# Patient Record
Sex: FEMALE | Race: WHITE | NOT HISPANIC OR LATINO | Employment: UNEMPLOYED | URBAN - METROPOLITAN AREA
[De-identification: names, ages, dates, MRNs, and addresses within clinical notes are randomized per-mention and may not be internally consistent; named-entity substitution may affect disease eponyms.]

---

## 2017-05-19 RX ORDER — SUMATRIPTAN SUCCINATE 100 MG/1
TABLET ORAL
Qty: 6 TABLET | Refills: 3 | Status: SHIPPED | OUTPATIENT
Start: 2017-05-19 | End: 2023-02-01

## 2018-04-14 RX ORDER — SUMATRIPTAN SUCCINATE 100 MG/1
TABLET ORAL
OUTPATIENT
Start: 2018-04-14

## 2019-04-16 ENCOUNTER — CLINICAL SUPPORT (OUTPATIENT)
Dept: URGENT CARE | Facility: CLINIC | Age: 49
End: 2019-04-16
Payer: COMMERCIAL

## 2019-04-16 VITALS
RESPIRATION RATE: 16 BRPM | WEIGHT: 176.63 LBS | OXYGEN SATURATION: 94 % | TEMPERATURE: 98 F | HEART RATE: 88 BPM | BODY MASS INDEX: 27.66 KG/M2 | DIASTOLIC BLOOD PRESSURE: 79 MMHG | SYSTOLIC BLOOD PRESSURE: 128 MMHG

## 2019-04-16 DIAGNOSIS — M79.10 MYALGIA: ICD-10-CM

## 2019-04-16 DIAGNOSIS — R68.89 FLU-LIKE SYMPTOMS: Primary | ICD-10-CM

## 2019-04-16 LAB
CTP QC/QA: YES
FLUAV AG NPH QL: NEGATIVE
FLUBV AG NPH QL: NEGATIVE

## 2019-04-16 PROCEDURE — 87804 INFLUENZA ASSAY W/OPTIC: CPT | Mod: QW,,, | Performed by: NURSE PRACTITIONER

## 2019-04-16 PROCEDURE — 87804 POCT INFLUENZA A/B: ICD-10-PCS | Mod: 59,QW,, | Performed by: NURSE PRACTITIONER

## 2019-04-16 PROCEDURE — 99204 PR OFFICE/OUTPT VISIT, NEW, LEVL IV, 45-59 MIN: ICD-10-PCS | Mod: 25,S$GLB,, | Performed by: NURSE PRACTITIONER

## 2019-04-16 PROCEDURE — 96372 PR INJECTION,THERAP/PROPH/DIAG2ST, IM OR SUBCUT: ICD-10-PCS | Mod: S$GLB,,, | Performed by: NURSE PRACTITIONER

## 2019-04-16 PROCEDURE — 96372 THER/PROPH/DIAG INJ SC/IM: CPT | Mod: S$GLB,,, | Performed by: NURSE PRACTITIONER

## 2019-04-16 PROCEDURE — 99204 OFFICE O/P NEW MOD 45 MIN: CPT | Mod: 25,S$GLB,, | Performed by: NURSE PRACTITIONER

## 2019-04-16 RX ORDER — PREDNISONE 20 MG/1
20 TABLET ORAL 2 TIMES DAILY
Qty: 10 TABLET | Refills: 0 | Status: SHIPPED | OUTPATIENT
Start: 2019-04-16 | End: 2019-04-21

## 2019-04-16 RX ORDER — DEXAMETHASONE SODIUM PHOSPHATE 4 MG/ML
8 INJECTION, SOLUTION INTRA-ARTICULAR; INTRALESIONAL; INTRAMUSCULAR; INTRAVENOUS; SOFT TISSUE
Status: COMPLETED | OUTPATIENT
Start: 2019-04-16 | End: 2019-04-16

## 2019-04-16 RX ORDER — CODEINE PHOSPHATE AND GUAIFENESIN 10; 100 MG/5ML; MG/5ML
5 SOLUTION ORAL 3 TIMES DAILY PRN
Qty: 120 ML | Refills: 0 | Status: SHIPPED | OUTPATIENT
Start: 2019-04-16 | End: 2019-04-26

## 2019-04-16 RX ADMIN — DEXAMETHASONE SODIUM PHOSPHATE 8 MG: 4 INJECTION, SOLUTION INTRA-ARTICULAR; INTRALESIONAL; INTRAMUSCULAR; INTRAVENOUS; SOFT TISSUE at 11:04

## 2019-04-16 NOTE — PROGRESS NOTES
Subjective: cough, HA. Bodyaches, stomach ache, nausea, runny nose       Patient ID: Glenis Ruggiero is a 48 y.o. female.    Vitals:  weight is 80.1 kg (176 lb 9.6 oz). Her temperature is 98.4 °F (36.9 °C). Her blood pressure is 128/79 and her pulse is 88. Her respiration is 16 and oxygen saturation is 94% (abnormal).     Chief Complaint: Sinus Problem (cough, bodyaches, stomach ache, nausea)    Sinus Problem   This is a new problem. The current episode started yesterday. The problem is unchanged. Associated symptoms include chills, congestion, coughing, headaches, sinus pressure and a sore throat. Pertinent negatives include no shortness of breath. The treatment provided no relief.       Constitution: Positive for chills, fatigue and fever.   HENT: Positive for congestion, postnasal drip, sinus pain, sinus pressure and sore throat.    Neck: Negative for painful lymph nodes.   Cardiovascular: Negative for chest pain and leg swelling.   Eyes: Negative for double vision and blurred vision.   Respiratory: Positive for cough. Negative for shortness of breath.    Gastrointestinal: Negative for nausea, vomiting and diarrhea.   Genitourinary: Negative for dysuria, frequency, urgency and history of kidney stones.   Musculoskeletal: Negative for joint pain, joint swelling, muscle cramps and muscle ache.   Skin: Negative for color change, pale, rash and bruising.   Allergic/Immunologic: Negative for seasonal allergies.   Neurological: Positive for headaches. Negative for dizziness, history of vertigo, light-headedness and passing out.   Hematologic/Lymphatic: Negative for swollen lymph nodes.   Psychiatric/Behavioral: Negative for nervous/anxious, sleep disturbance and depression. The patient is not nervous/anxious.        Objective:      Physical Exam   Constitutional: She is oriented to person, place, and time. Vital signs are normal. She appears well-developed and well-nourished. She is cooperative. She appears ill.   HENT:    Head: Normocephalic.   Right Ear: Hearing, external ear and ear canal normal. A middle ear effusion is present.   Left Ear: Hearing, external ear and ear canal normal. A middle ear effusion is present.   Nose: Mucosal edema and rhinorrhea present. Right sinus exhibits maxillary sinus tenderness. Left sinus exhibits maxillary sinus tenderness.   Mouth/Throat: Uvula is midline and mucous membranes are normal. Posterior oropharyngeal erythema present.   Eyes: Pupils are equal, round, and reactive to light. Conjunctivae, EOM and lids are normal.   Neck: Trachea normal, normal range of motion, full passive range of motion without pain and phonation normal. Neck supple.   Cardiovascular: Normal rate, regular rhythm, normal heart sounds, intact distal pulses and normal pulses.   Pulmonary/Chest: Effort normal and breath sounds normal.   Abdominal: Soft. Normal appearance, normal aorta and bowel sounds are normal. There is no tenderness.   Musculoskeletal: Normal range of motion.   Neurological: She is alert and oriented to person, place, and time. She has normal strength. GCS eye subscore is 4. GCS verbal subscore is 5. GCS motor subscore is 6.   Skin: Skin is warm, dry and intact. Capillary refill takes less than 2 seconds.   Psychiatric: She has a normal mood and affect. Her speech is normal and behavior is normal. Judgment and thought content normal. Cognition and memory are normal.       Assessment:       1. Flu-like symptoms    2. Myalgia        Plan:         Flu-like symptoms    Myalgia  -     POCT Influenza A/B    Other orders  -     baloxavir marboxil 40 mg Tab; Take 80 mg by mouth once. for 1 dose  Dispense: 2 tablet; Refill: 0  -     dexamethasone injection 8 mg  -     predniSONE (DELTASONE) 20 MG tablet; Take 1 tablet (20 mg total) by mouth 2 (two) times daily. for 5 days  Dispense: 10 tablet; Refill: 0  -     guaifenesin-codeine 100-10 mg/5 ml (CHERATUSSIN AC)  mg/5 mL syrup; Take 5 mLs by mouth 3  (three) times daily as needed for Cough.  Dispense: 120 mL; Refill: 0

## 2019-09-03 DIAGNOSIS — N95.8 POSTARTIFICIAL MENOPAUSAL SYNDROME: ICD-10-CM

## 2019-09-03 DIAGNOSIS — Z13.820 SCREENING FOR OSTEOPOROSIS: Primary | ICD-10-CM

## 2019-09-03 DIAGNOSIS — Z12.39 SCREENING BREAST EXAMINATION: ICD-10-CM

## 2019-09-06 DIAGNOSIS — N64.9 DISORDER OF BREAST: Primary | ICD-10-CM

## 2019-09-13 ENCOUNTER — HOSPITAL ENCOUNTER (OUTPATIENT)
Dept: RADIOLOGY | Facility: HOSPITAL | Age: 49
Discharge: HOME | End: 2019-09-13
Attending: SPECIALIST
Payer: COMMERCIAL

## 2019-09-13 ENCOUNTER — HOSPITAL ENCOUNTER (OUTPATIENT)
Dept: RADIOLOGY | Facility: HOSPITAL | Age: 49
Discharge: HOME | End: 2019-09-13
Attending: NURSE PRACTITIONER
Payer: COMMERCIAL

## 2019-09-13 VITALS — HEIGHT: 67 IN | BODY MASS INDEX: 25.9 KG/M2 | WEIGHT: 165 LBS

## 2019-09-13 DIAGNOSIS — N64.9 DISORDER OF BREAST: ICD-10-CM

## 2019-09-13 DIAGNOSIS — N95.8 POSTARTIFICIAL MENOPAUSAL SYNDROME: ICD-10-CM

## 2019-09-13 DIAGNOSIS — Z13.820 SCREENING FOR OSTEOPOROSIS: ICD-10-CM

## 2019-09-13 PROCEDURE — 77062 BREAST TOMOSYNTHESIS BI: CPT | Mod: TC,PO

## 2019-09-13 PROCEDURE — 77080 DXA BONE DENSITY AXIAL: CPT | Mod: TC,PO

## 2019-09-13 PROCEDURE — 76642 ULTRASOUND BREAST LIMITED: CPT | Mod: TC,PO,LT

## 2020-10-22 ENCOUNTER — HOSPITAL ENCOUNTER (OUTPATIENT)
Dept: RADIOLOGY | Facility: HOSPITAL | Age: 50
Discharge: HOME | End: 2020-10-22
Attending: SPECIALIST
Payer: COMMERCIAL

## 2020-10-22 DIAGNOSIS — Z12.31 BREAST CANCER SCREENING BY MAMMOGRAM: ICD-10-CM

## 2020-10-22 PROCEDURE — 77067 SCR MAMMO BI INCL CAD: CPT | Mod: TC,PO

## 2020-11-18 DIAGNOSIS — R92.8 ABNORMAL FINDINGS ON DIAGNOSTIC IMAGING OF BREAST: Primary | ICD-10-CM

## 2021-01-22 ENCOUNTER — HOSPITAL ENCOUNTER (OUTPATIENT)
Dept: RADIOLOGY | Facility: HOSPITAL | Age: 51
Discharge: HOME | End: 2021-01-22
Attending: NURSE PRACTITIONER
Payer: COMMERCIAL

## 2021-01-22 DIAGNOSIS — R92.8 ABNORMAL FINDINGS ON DIAGNOSTIC IMAGING OF BREAST: ICD-10-CM

## 2021-01-22 PROCEDURE — 77061 BREAST TOMOSYNTHESIS UNI: CPT | Mod: TC,PO,LT

## 2021-11-16 DIAGNOSIS — Z78.0 MENOPAUSE: Primary | ICD-10-CM

## 2021-12-01 DIAGNOSIS — Z12.31 ENCOUNTER FOR SCREENING MAMMOGRAM FOR MALIGNANT NEOPLASM OF BREAST: Primary | ICD-10-CM

## 2022-08-19 ENCOUNTER — HOSPITAL ENCOUNTER (EMERGENCY)
Facility: HOSPITAL | Age: 52
Discharge: HOME | End: 2022-08-19
Attending: EMERGENCY MEDICINE
Payer: COMMERCIAL

## 2022-08-19 VITALS
SYSTOLIC BLOOD PRESSURE: 125 MMHG | WEIGHT: 150.38 LBS | HEART RATE: 84 BPM | RESPIRATION RATE: 22 BRPM | TEMPERATURE: 97 F | OXYGEN SATURATION: 100 % | DIASTOLIC BLOOD PRESSURE: 73 MMHG | BODY MASS INDEX: 23.6 KG/M2 | HEIGHT: 67 IN

## 2022-08-19 DIAGNOSIS — K52.9 GASTROENTERITIS: Primary | ICD-10-CM

## 2022-08-19 DIAGNOSIS — K51.80 OTHER ULCERATIVE COLITIS WITHOUT COMPLICATION: ICD-10-CM

## 2022-08-19 LAB
ALBUMIN SERPL BCP-MCNC: 3.7 G/DL (ref 3.5–5.2)
ALP SERPL-CCNC: 80 U/L (ref 55–135)
ALT SERPL W/O P-5'-P-CCNC: 26 U/L (ref 10–44)
ANION GAP SERPL CALC-SCNC: 12 MMOL/L (ref 8–16)
AST SERPL-CCNC: 24 U/L (ref 10–40)
BASOPHILS # BLD AUTO: 0.04 K/UL (ref 0–0.2)
BASOPHILS NFR BLD: 0.3 % (ref 0–1.9)
BILIRUB SERPL-MCNC: 0.7 MG/DL (ref 0.1–1)
BUN SERPL-MCNC: 17 MG/DL (ref 6–20)
CALCIUM SERPL-MCNC: 8.9 MG/DL (ref 8.7–10.5)
CHLORIDE SERPL-SCNC: 104 MMOL/L (ref 95–110)
CO2 SERPL-SCNC: 24 MMOL/L (ref 23–29)
CREAT SERPL-MCNC: 0.7 MG/DL (ref 0.5–1.4)
DIFFERENTIAL METHOD: ABNORMAL
EOSINOPHIL # BLD AUTO: 0.1 K/UL (ref 0–0.5)
EOSINOPHIL NFR BLD: 0.7 % (ref 0–8)
ERYTHROCYTE [DISTWIDTH] IN BLOOD BY AUTOMATED COUNT: 12.3 % (ref 11.5–14.5)
EST. GFR  (NO RACE VARIABLE): >60 ML/MIN/1.73 M^2
GLUCOSE SERPL-MCNC: 114 MG/DL (ref 70–110)
HCT VFR BLD AUTO: 43.8 % (ref 37–48.5)
HGB BLD-MCNC: 15.3 G/DL (ref 12–16)
IMM GRANULOCYTES # BLD AUTO: 0.07 K/UL (ref 0–0.04)
IMM GRANULOCYTES NFR BLD AUTO: 0.5 % (ref 0–0.5)
LIPASE SERPL-CCNC: 8 U/L (ref 4–60)
LYMPHOCYTES # BLD AUTO: 0.7 K/UL (ref 1–4.8)
LYMPHOCYTES NFR BLD: 4.6 % (ref 18–48)
MCH RBC QN AUTO: 33 PG (ref 27–31)
MCHC RBC AUTO-ENTMCNC: 34.9 G/DL (ref 32–36)
MCV RBC AUTO: 95 FL (ref 82–98)
MONOCYTES # BLD AUTO: 0.4 K/UL (ref 0.3–1)
MONOCYTES NFR BLD: 2.5 % (ref 4–15)
NEUTROPHILS # BLD AUTO: 14.1 K/UL (ref 1.8–7.7)
NEUTROPHILS NFR BLD: 91.4 % (ref 38–73)
NRBC BLD-RTO: 0 /100 WBC
PLATELET # BLD AUTO: 256 K/UL (ref 150–450)
PMV BLD AUTO: 9.5 FL (ref 9.2–12.9)
POTASSIUM SERPL-SCNC: 3.5 MMOL/L (ref 3.5–5.1)
PROT SERPL-MCNC: 6.5 G/DL (ref 6–8.4)
RBC # BLD AUTO: 4.63 M/UL (ref 4–5.4)
SARS-COV-2 RDRP RESP QL NAA+PROBE: NEGATIVE
SODIUM SERPL-SCNC: 140 MMOL/L (ref 136–145)
WBC # BLD AUTO: 15.38 K/UL (ref 3.9–12.7)

## 2022-08-19 PROCEDURE — A9698 NON-RAD CONTRAST MATERIALNOC: HCPCS

## 2022-08-19 PROCEDURE — 96361 HYDRATE IV INFUSION ADD-ON: CPT

## 2022-08-19 PROCEDURE — 25500020 PHARM REV CODE 255

## 2022-08-19 PROCEDURE — 25000003 PHARM REV CODE 250: Performed by: NURSE PRACTITIONER

## 2022-08-19 PROCEDURE — 96375 TX/PRO/DX INJ NEW DRUG ADDON: CPT

## 2022-08-19 PROCEDURE — 80053 COMPREHEN METABOLIC PANEL: CPT | Performed by: NURSE PRACTITIONER

## 2022-08-19 PROCEDURE — 36415 COLL VENOUS BLD VENIPUNCTURE: CPT | Performed by: EMERGENCY MEDICINE

## 2022-08-19 PROCEDURE — 83690 ASSAY OF LIPASE: CPT | Performed by: NURSE PRACTITIONER

## 2022-08-19 PROCEDURE — U0002 COVID-19 LAB TEST NON-CDC: HCPCS | Performed by: NURSE PRACTITIONER

## 2022-08-19 PROCEDURE — 86803 HEPATITIS C AB TEST: CPT | Performed by: EMERGENCY MEDICINE

## 2022-08-19 PROCEDURE — 96374 THER/PROPH/DIAG INJ IV PUSH: CPT

## 2022-08-19 PROCEDURE — 63600175 PHARM REV CODE 636 W HCPCS: Performed by: EMERGENCY MEDICINE

## 2022-08-19 PROCEDURE — 85025 COMPLETE CBC W/AUTO DIFF WBC: CPT | Performed by: NURSE PRACTITIONER

## 2022-08-19 PROCEDURE — 99285 EMERGENCY DEPT VISIT HI MDM: CPT | Mod: 25

## 2022-08-19 PROCEDURE — 87389 HIV-1 AG W/HIV-1&-2 AB AG IA: CPT | Performed by: EMERGENCY MEDICINE

## 2022-08-19 PROCEDURE — 63600175 PHARM REV CODE 636 W HCPCS: Performed by: NURSE PRACTITIONER

## 2022-08-19 RX ORDER — MORPHINE SULFATE 2 MG/ML
6 INJECTION, SOLUTION INTRAMUSCULAR; INTRAVENOUS
Status: COMPLETED | OUTPATIENT
Start: 2022-08-19 | End: 2022-08-19

## 2022-08-19 RX ORDER — ONDANSETRON 2 MG/ML
4 INJECTION INTRAMUSCULAR; INTRAVENOUS
Status: COMPLETED | OUTPATIENT
Start: 2022-08-19 | End: 2022-08-19

## 2022-08-19 RX ORDER — ONDANSETRON 4 MG/1
4 TABLET, ORALLY DISINTEGRATING ORAL EVERY 6 HOURS PRN
Qty: 20 TABLET | Refills: 0 | OUTPATIENT
Start: 2022-08-19 | End: 2022-12-09

## 2022-08-19 RX ADMIN — SODIUM CHLORIDE 1000 ML: 0.9 INJECTION, SOLUTION INTRAVENOUS at 12:08

## 2022-08-19 RX ADMIN — IOHEXOL 75 ML: 350 INJECTION, SOLUTION INTRAVENOUS at 01:08

## 2022-08-19 RX ADMIN — IOHEXOL 600 ML: 9 SOLUTION ORAL at 01:08

## 2022-08-19 RX ADMIN — MORPHINE SULFATE 6 MG: 2 INJECTION, SOLUTION INTRAMUSCULAR; INTRAVENOUS at 12:08

## 2022-08-19 RX ADMIN — ONDANSETRON 4 MG: 2 INJECTION INTRAMUSCULAR; INTRAVENOUS at 12:08

## 2022-08-19 NOTE — ED PROVIDER NOTES
Encounter Date: 8/19/2022    SCRIBE #1 NOTE: I, Kailey Klein, am scribing for, and in the presence of, TED Darling.       History     Chief Complaint   Patient presents with    Vomiting     N/VD since last night     Time seen by provider: 12:12 PM on 08/19/2022    Glenis Ruggiero is a 51 y.o. female with PMHx of ileus and ulcerative colitis who presents to the ED via EMS with an onset of N/V/D and chills that began this morning. Sx are more consistent with prior ileus episodes rather than ulcerative colitis flare ups. The patient denies fever, burning with urination, blood in stool, or any other symptoms at this time. Denies alcohol or drug use. PMHx of ileus, ulcerative colitis, depression, and anxiety. Denies abdominal PSHx. NKDA.    The history is provided by the patient.     Review of patient's allergies indicates:  No Known Allergies  Past Medical History:   Diagnosis Date    ADD (attention deficit disorder)     Anxiety     Cancer 01/2021    breast CA     Depression      Past Surgical History:   Procedure Laterality Date    AUGMENTATION OF BREAST      BREAST SURGERY      implants and lift    NASAL SEPTUM SURGERY      TONSILLECTOMY       Family History   Problem Relation Age of Onset    Ephraim syndrome Son     Thyroid disease Maternal Aunt     Breast cancer Maternal Aunt     Cancer Maternal Aunt 45        breast cancer    Lupus Neg Hx     Psoriasis Neg Hx     Rheum arthritis Neg Hx      Social History     Tobacco Use    Smoking status: Former Smoker    Smokeless tobacco: Never Used    Tobacco comment: 24 year old son with Jamaal's disease which causes mental retardation; she is ; 3 children total   Substance Use Topics    Alcohol use: Yes     Comment: Couple drinks every 2 weeks    Drug use: No     Review of Systems   Constitutional: Positive for chills. Negative for fever.   HENT: Negative for sore throat.    Respiratory: Negative for shortness of breath.    Cardiovascular:  Negative for chest pain.   Gastrointestinal: Positive for diarrhea, nausea and vomiting. Negative for blood in stool.   Genitourinary: Negative for dysuria.   Musculoskeletal: Negative for back pain.   Skin: Negative for rash.   Neurological: Negative for weakness.   Hematological: Does not bruise/bleed easily.       Physical Exam     Initial Vitals [08/19/22 1148]   BP Pulse Resp Temp SpO2   (!) 171/86 (!) 59 20 96.5 °F (35.8 °C) 100 %      MAP       --         Physical Exam    Nursing note and vitals reviewed.  Constitutional: Vital signs are normal. She appears well-developed and well-nourished.   HENT:   Head: Normocephalic and atraumatic.   Eyes: Pupils are equal, round, and reactive to light.   Neck: Neck supple.   Cardiovascular: Normal rate, regular rhythm, normal heart sounds and intact distal pulses. Exam reveals no gallop and no friction rub.    No murmur heard.  Pulmonary/Chest: Breath sounds normal. She has no wheezes. She has no rhonchi. She has no rales.   Abdominal: Abdomen is soft. Bowel sounds are normal. She exhibits no distension. There is abdominal tenderness in the right lower quadrant. There is no rebound and no guarding.   Musculoskeletal:      Cervical back: Neck supple.     Neurological: She is alert and oriented to person, place, and time. She has normal strength.   Skin: Skin is warm, dry and intact.   Psychiatric: Her speech is normal and behavior is normal. Her mood appears anxious.   Tearful.         ED Course   Procedures  Labs Reviewed   CBC W/ AUTO DIFFERENTIAL - Abnormal; Notable for the following components:       Result Value    WBC 15.38 (*)     MCH 33.0 (*)     Gran # (ANC) 14.1 (*)     Immature Grans (Abs) 0.07 (*)     Lymph # 0.7 (*)     Gran % 91.4 (*)     Lymph % 4.6 (*)     Mono % 2.5 (*)     All other components within normal limits   COMPREHENSIVE METABOLIC PANEL - Abnormal; Notable for the following components:    Glucose 114 (*)     All other components within normal  limits   LIPASE   SARS-COV-2 RNA AMPLIFICATION, QUAL   HIV 1 / 2 ANTIBODY   HEPATITIS C ANTIBODY          Imaging Results          CT Abdomen Pelvis With Contrast (Final result)  Result time 08/19/22 14:06:17    Final result by Jatin Ramos MD (08/19/22 14:06:17)                 Impression:      No acute abdominopelvic process.      Electronically signed by: Jatin Ramos MD  Date:    08/19/2022  Time:    14:06             Narrative:    EXAMINATION:  CT ABDOMEN PELVIS WITH CONTRAST    CLINICAL HISTORY:  RLQ abdominal pain (Age >= 14y);    TECHNIQUE:  Low dose axial images, sagittal and coronal reformations were obtained from the lung bases to the pubic symphysis following the IV administration of 75 mL of Omnipaque 350 and the oral administration of 600 mL of Omnipaque 9.    COMPARISON:  10/30/2017    FINDINGS:  The liver, spleen, pancreas, kidneys and adrenal glands are unremarkable.    The bowel is nondilated.  The appendix is normal.  There is mild diverticulosis coli.    No free air or free fluid.  The aorta is normal caliber.  Breast implants are partially imaged.  Mild thoracolumbar scoliosis is present.                                 Medications   sodium chloride 0.9% bolus 1,000 mL (0 mLs Intravenous Stopped 8/19/22 1352)   ondansetron injection 4 mg (4 mg Intravenous Given 8/19/22 1253)   morphine injection 6 mg (6 mg Intravenous Given 8/19/22 1257)   iohexoL (OMNIPAQUE 350) 350 mg iodine/mL injection (75 mLs Intravenous Given 8/19/22 1350)   iohexoL (OMNIPAQUE 9) 9 mg iodine/mL oral solution (600 mLs Oral Given 8/19/22 1350)     Medical Decision Making:   History:   Old Medical Records: I decided to obtain old medical records.  Differential Diagnosis:   Ulcerative colitis  Gastroenteritis  SBO  Clinical Tests:   Lab Tests: Ordered and Reviewed  Radiological Study: Ordered and Reviewed       APC / Resident Notes:   Patient is a 51 y.o. female who presents to the ED 08/19/2022 who underwent  emergent evaluation for vomiting and diarrhea since this morning. Pt has history ulcerative colitis.  She has no distention or guarding.  Mild right lower quadrant tenderness.  She is not febrile.  Vital signs normal.  CT without evidence of any emergent process such as obstruction, perforation, abscess, appendicitis, colitis.  Mild leukocytosis.  Likely secondary to vomiting.  No sign of any acute bacterial infection requiring antibiotics at this time.  Symptoms are greatly improved with medications and IV fluids in the emergency department.  Patient is currently on Humira and steroids.  Discussed short course of increased steroids but patient will further discuss with her gastroenterologist Dr. Clemente prior to initiating this. I  Believe this is reasonable.  Labs are otherwise unremarkable. Based on my clinical evaluation, I do not appreciate any immediate, emergent, or life threatening condition or etiology that warrants additional workup today and feel that the patient can be discharged with close follow up care. Case discussed with  who also evaluated patient and  who is agreeable to plan of care. Follow up and return precautions discussed; patient verbalized understanding and is agreeable to plan of care. Patient discharged home in stable condition.            Scribe Attestation:   Scribe #1: I performed the above scribed service and the documentation accurately describes the services I performed. I attest to the accuracy of the note.    Attending Attestation:           Physician Attestation for Scribe:  Physician Attestation Statement for Scribe #1: I, Nayely Trammell, reviewed documentation, as scribed by in my presence, and it is both accurate and complete.     Comments: I, Nayely Trammell, NP-C, personally performed the services described in this documentation. All medical record entries made by the scribe were at my direction and in my presence.  I have reviewed the chart and agree that the record  reflects my personal performance and is accurate and complete. BRIANA DarlingC.  3:25 PM 08/19/2022                   Clinical Impression:   Final diagnoses:  [K52.9] Gastroenteritis (Primary)  [K51.80] Other ulcerative colitis without complication          ED Disposition Condition    Discharge Stable        ED Prescriptions     Medication Sig Dispense Start Date End Date Auth. Provider    ondansetron (ZOFRAN-ODT) 4 MG TbDL Take 1 tablet (4 mg total) by mouth every 6 (six) hours as needed. 20 tablet 8/19/2022  Nayely Trammell NP        Follow-up Information     Follow up With Specialties Details Why Contact Info    Mane Rios MD Gastroenterology In 3 days  [id number] [facility] [id number]  [phone]      [facility] Emergency Medicine  As needed, If symptoms worsen [address] [place] [id number]  [phone]           Nayely Trammell NP  08/19/22 4041

## 2022-08-22 LAB
HCV AB SERPL QL IA: NEGATIVE
HIV 1+2 AB+HIV1 P24 AG SERPL QL IA: NEGATIVE

## 2022-11-15 ENCOUNTER — OFFICE VISIT (OUTPATIENT)
Dept: UROLOGY | Facility: CLINIC | Age: 52
End: 2022-11-15
Payer: COMMERCIAL

## 2022-11-15 VITALS
RESPIRATION RATE: 18 BRPM | HEIGHT: 67 IN | BODY MASS INDEX: 20.4 KG/M2 | WEIGHT: 130 LBS | DIASTOLIC BLOOD PRESSURE: 90 MMHG | SYSTOLIC BLOOD PRESSURE: 142 MMHG | HEART RATE: 93 BPM

## 2022-11-15 DIAGNOSIS — N81.4 CYSTOCELE WITH PROLAPSE: ICD-10-CM

## 2022-11-15 DIAGNOSIS — N32.81 OAB (OVERACTIVE BLADDER): Primary | ICD-10-CM

## 2022-11-15 DIAGNOSIS — N39.46 MIXED INCONTINENCE URGE AND STRESS: ICD-10-CM

## 2022-11-15 LAB
BILIRUBIN, UA POC OHS: NEGATIVE
BLOOD, UA POC OHS: NEGATIVE
CLARITY, UA POC OHS: CLEAR
COLOR, UA POC OHS: YELLOW
GLUCOSE, UA POC OHS: NEGATIVE
KETONES, UA POC OHS: NEGATIVE
LEUKOCYTES, UA POC OHS: NEGATIVE
NITRITE, UA POC OHS: NEGATIVE
PH, UA POC OHS: 5
POC RESIDUAL URINE VOLUME: 0 ML (ref 0–100)
PROTEIN, UA POC OHS: NEGATIVE
SPECIFIC GRAVITY, UA POC OHS: >=1.03
UROBILINOGEN, UA POC OHS: 0.2

## 2022-11-15 PROCEDURE — 81003 URINALYSIS AUTO W/O SCOPE: CPT | Mod: QW,S$GLB,, | Performed by: UROLOGY

## 2022-11-15 PROCEDURE — 51725 PR SIMPLE CYSTOMETROGRAM: ICD-10-PCS | Mod: 26,S$GLB,, | Performed by: UROLOGY

## 2022-11-15 PROCEDURE — 81003 POCT URINALYSIS(INSTRUMENT): ICD-10-PCS | Mod: QW,S$GLB,, | Performed by: UROLOGY

## 2022-11-15 PROCEDURE — 51725 SIMPLE CYSTOMETROGRAM: CPT | Mod: 26,S$GLB,, | Performed by: UROLOGY

## 2022-11-15 PROCEDURE — 99204 PR OFFICE/OUTPT VISIT, NEW, LEVL IV, 45-59 MIN: ICD-10-PCS | Mod: 25,S$GLB,, | Performed by: UROLOGY

## 2022-11-15 PROCEDURE — 51798 US URINE CAPACITY MEASURE: CPT | Mod: S$GLB,,, | Performed by: UROLOGY

## 2022-11-15 PROCEDURE — 99999 PR PBB SHADOW E&M-EST. PATIENT-LVL V: CPT | Mod: PBBFAC,,, | Performed by: UROLOGY

## 2022-11-15 PROCEDURE — 51798 POCT BLADDER SCAN: ICD-10-PCS | Mod: S$GLB,,, | Performed by: UROLOGY

## 2022-11-15 PROCEDURE — 99999 PR PBB SHADOW E&M-EST. PATIENT-LVL V: ICD-10-PCS | Mod: PBBFAC,,, | Performed by: UROLOGY

## 2022-11-15 PROCEDURE — 99204 OFFICE O/P NEW MOD 45 MIN: CPT | Mod: 25,S$GLB,, | Performed by: UROLOGY

## 2022-11-15 RX ORDER — CLONAZEPAM 0.5 MG/1
TABLET ORAL
COMMUNITY

## 2022-11-15 RX ORDER — ADALIMUMAB 40MG/0.4ML
KIT SUBCUTANEOUS
Status: ON HOLD | COMMUNITY
End: 2023-07-20 | Stop reason: CLARIF

## 2022-11-15 RX ORDER — NARATRIPTAN 2.5 MG/1
TABLET ORAL
COMMUNITY

## 2022-11-15 RX ORDER — MUPIROCIN 20 MG/G
OINTMENT TOPICAL 3 TIMES DAILY
COMMUNITY
Start: 2022-07-25

## 2022-11-15 RX ORDER — MIRABEGRON 50 MG/1
50 TABLET, FILM COATED, EXTENDED RELEASE ORAL EVERY MORNING
Qty: 90 TABLET | Refills: 3 | Status: SHIPPED | OUTPATIENT
Start: 2022-11-15 | End: 2023-02-01

## 2022-11-15 RX ORDER — ONDANSETRON 4 MG/1
TABLET, FILM COATED ORAL
COMMUNITY
End: 2024-03-22

## 2022-11-15 RX ORDER — LISINOPRIL 10 MG/1
TABLET ORAL NIGHTLY
COMMUNITY

## 2022-11-15 RX ORDER — TESTOSTERONE GEL, 1% 10 MG/G
GEL TRANSDERMAL
COMMUNITY

## 2022-11-15 RX ORDER — ACETAZOLAMIDE 125 MG/1
TABLET ORAL
COMMUNITY
End: 2023-02-01

## 2022-11-15 RX ORDER — CIPROFLOXACIN 500 MG/1
TABLET ORAL
COMMUNITY
Start: 2022-10-15 | End: 2023-02-01

## 2022-11-15 RX ORDER — PROGESTERONE 100 MG/1
CAPSULE ORAL NIGHTLY
Status: ON HOLD | COMMUNITY
End: 2023-07-20 | Stop reason: CLARIF

## 2022-11-15 RX ORDER — INSULIN DETEMIR 100 [IU]/ML
INJECTION, SOLUTION SUBCUTANEOUS
Status: ON HOLD | COMMUNITY
End: 2023-07-20 | Stop reason: CLARIF

## 2022-11-15 RX ORDER — ESTRADIOL 1.53 MG/1
SPRAY TRANSDERMAL
COMMUNITY

## 2022-11-15 NOTE — PATIENT INSTRUCTIONS
Assessment:   Glenis Ruggiero is a 52 y.o. female     sHe has significant stress urinary incontinence seen on exam with an empty bladder however also a significant anterior prolapse.  Therefore I do not think that she would benefit from a sling by itself because it could can kink off her urethra causing obstruction.    Therefore I am referring her to Dr. Briseno, urogynecology to be evaluated for prolapse/stress incontinence.  In the meantime I am going to put her on overactive bladder medicines since a significant cystocele like that can worsen urinary urgency and frequency.  However it will not affect her stress incontinence and can actually worsened it if she holds her bladder      1. OAB (overactive bladder)    2. Mixed incontinence urge and stress    3. Cystocele with prolapse         Plan:    Specific to this patient: (in addition see below)  Refer to Dr. Briseno for evaluation for prolapse and stress incontinence diagnosed today on stress test.  And start myrbetriq 50 mg once daily for overactive bladder and frequency.  If it is too expensive see the list and contact us.  Can have refills by Dr. Briseno if helpful.  May be better after surgery and needed for few months or lifelong depending on how she does.    Medication plan: goal is to decrease in unexpected urine leakage associated with urge and less urinary frequency  For the patient's overactive bladder,  start myrbetriq 50mg , if too expensive, she will try to ditropan 10mg XL once daily. She will need to contact me if she needs me to write for oxybutynin.  She was told it often takes 4 to 6 weeks to see any results from myrbetriq.   If she cannot take myrbetriq, she was told that all the other medicines have a small risk of causing dry mouth or constipation and if necessary we can discontinue the medicine and try another one on her list.   If too expensive or if mybetriq doesn't help after 4 weeks we will try ditropan 10mg XL/oxybutynin once daily  in th morning for at least 2 weeks. This one should show improvement fairly quickly (over a few days). Side effects can include dry mouth and/or constipation.   She will need to contact me for this prescriotion.       Stress incontinence - leaking with activity such as laughing, exercise, coughing or sneezing  instructed on kegels (10x in a row, 2x a day- given info on this today). If no improvement, can refer to pelvic floor physical therapy with dynamic in [place]  Check out [facility] pelvic health blog for tips  Timed voiding: void every 2 hours (keep bladder empty). The less urine in bladder the less leakage you may experience.   See above    If your overactive bladder medication is too expensive, do the following:   Please call your  pharmacy or insurance and find out exactly what your  copay is for each overactive bladder medicine (list below) and call us or more preferablly write us back with which medicine you would like to try (that she has not tried already) and where you want it sent     List of Available Overactive Bladder medications- find out which is cheapest from pharmacist or your insurance  Detrol/Tolteridine -Drugs such as trospium, tolterodine, and darifenacin have chemical properties that make them unlikely to reach the brain, and therefore less likely to cause cognitive side effect/confusion  Sanctura/trospium-Drugs such as trospium, tolterodine, and darifenacin have chemical properties that make them unlikely to reach the brain, and therefore less likely to cause cognitive side effect/confusion  Enablex/darifenacin-Drugs such as trospium, tolterodine, and darifenacin have chemical properties that make them unlikely to reach the brain, and therefore less likely to cause cognitive side effect/confusion  Ditropan/oxybutynin Immediate release - this will be the cheapest but have the most side effects. Also works the fastest.   Ditropan/oxbutynin extended release-  this will be the second cheapest but  have the most side effects. Also works the fastest.   Toviaz/Fesoterodine  Vesicare/solfenacin  Myrbetriq/Mirbegron- best in older patients if affordable. No constipation. Confusion.   Gemtesa/Vibegron -best in older patients if affordable. No constipation. Confusion.     Important information about the medications:  Myrbetriq and Vibegron are the two that should not cause dry mouth and constipation. The other ones listed may or may not cause dry mouth or constipation.  Immediate release (IR) formulations also have more side effects than the extended release (ER) formulations. '    Gemtesa/Vibegron and Ditropan/oxybutynin are the only medications which should work fairly quickly (within a week or so). The rest can take 4 weeks to see effects    Some medications require that you fail the generic versions first- for example, no improvement of symptoms after 4 weeks or side effects resulting in discontinuation.     Goal of medication: decrease frequency of urination, decrease urgency associated with urination and decrease urinary incontinence episodes associated with urge (ie less pads)    When to try another medication or discontinue medication: If you see no improvement in your symptoms after 4 weeks or if you have side effects that prevent you from take medicine, we can try another medicine.     Contact us if no follow-up appointment has been made.      If still no improvement after that 2nd medicine then can discuss other treatment options for overactive bladder.

## 2022-11-15 NOTE — PROGRESS NOTES
[facility] Department of Urology  Pcp: ROXY Harris  DOS: 11/15/2022  Referred by: Self, Aaareferral    Initial consult by me in clinic on 11/15/22:    Glenis Ruggiero is a very pleasant 52 y.o. female who is a new patient to our department referred for incontinence.  The patient reports urinary incontinence of 10-12 years duration.   The patient has not seen a urologist in the past. Previous oab meds tried: No.   Symptoms: DTF: q 1-2 hours. Daytime urgency: yes. Daytime incontinence: 80.% of the time Daytime pad/depends usage: 5/day Pads: thin  NTF:2-3x per night. Nighttime urgency:  yes. Nighttime incontinence episodes: yes. Night-time pad/depends usage: 2  Overactive bladder risk factors/associations:  Dietary - Caffeine intake: No- decreased bc of UC. Neurogenic - Back surgery: No. Stroke: No. Diabetes: Yes - but has lost 30 pounds. UC- constipation/diarrhea  The patient also has what sounds like symptoms of Stress incontinence G3, P3.The patient reports (stress) incontinence associated with coughing lifting laughing sneezing exercise bending over intercourse or other exertional activities.   She has tried kegels. Symptoms/complaints of prolapse: No. Hysterectomy: No. Prior bladder or vaginal surgery: No. Bladder scan PVR today was 0mL.  Ua today neg.  Anticoagulation:  No.      Date: 11/15/22 Pelvic exam (stress test/cmg to evaluate stress vs urge incontinence) : SUPINE STRESS/LEAKTEST EMPTY: (+) stress test with cough, (+) stress test with valsalva.  In and out cath with 10cc residual - urine was not sent for sample. BLADDER FILLING: First sensation to void felt at 50 cc. Significant urge was met (150cc). Bladder filled to 150 cc. The catheter was then removed. SUPINE STRESS/LEAK TEST FULL: (+) large volumestress test with coughing , (+) stress test with valsalva . STANDING STRESS/LEAK TEST FULL: did not check standing bc leaked with supine   Other:: (+) SIGNIFICANT anterior  prolapse. - atrophic vaginitis. (--) urethral caruncle. (--) vaginal discharge (was not sent for vaginosis screen). Other notes:  SIGNIFICANT PROLAPSE AND GIACOMO. SEE PICS. REFERRING TO UROGYN.       Her most bothersome complaint today is: stress incontinence > UUI     Review of Systems:   As above    Urine history: family history of kidney, bladder or prostate cancer:No, personal or family history of kidney stones: No,tobacco use: Yes - 1 a day - quitting- over the last few years, anticoagulation: No  11/15/22 Neg    PVR History:  11/15/22 0      Past Medical History:   Diagnosis Date    ADD (attention deficit disorder)     Anxiety      01/2021        Depression          Exam Findings:  Vitals:    11/15/22 1148   BP: (!) 142/90   Pulse: 93   Resp: 18     SEE CMG 11/15/22 ABOVE                  Assessment:   Glenis Ruggiero is a 52 y.o. female     she has significant stress urinary incontinence seen on exam with an empty bladder however also a significant anterior prolapse.  Therefore I do not think that she would benefit from a sling by itself because it could can kink off her urethra causing obstruction.    Therefore I am referring her to Dr. Briseno, urogynecology to be evaluated for prolapse/stress incontinence.  In the meantime I am going to put her on overactive bladder medicines since a significant cystocele like that can worsen urinary urgency and frequency.  However it will not affect her stress incontinence and can actually worsened it if she holds her bladder      1. OAB (overactive bladder)    2. Mixed incontinence urge and stress    3. Cystocele with prolapse         Plan:    Specific to this patient: (in addition see below)  Refer to Dr. Briseno for evaluation for prolapse and stress incontinence diagnosed today on stress test.  And start myrbetriq 50 mg once daily for overactive bladder and frequency.  If it is too expensive see the list and contact us.  Can have refills by Dr. Briseno if helpful.   May be better after surgery and needed for few months or lifelong depending on how she does.    Medication plan: goal is to decrease in unexpected urine leakage associated with urge and less urinary frequency  For the patient's overactive bladder,  start myrbetriq 50mg , if too expensive, she will try to ditropan 10mg XL once daily. She will need to contact me if she needs me to write for oxybutynin.  She was told it often takes 4 to 6 weeks to see any results from myrbetriq.   If she cannot take myrbetriq, she was told that all the other medicines have a small risk of causing dry mouth or constipation and if necessary we can discontinue the medicine and try another one on her list.   If too expensive or if mybetriq doesn't help after 4 weeks we will try ditropan 10mg XL/oxybutynin once daily in th morning for at least 2 weeks. This one should show improvement fairly quickly (over a few days). Side effects can include dry mouth and/or constipation.   She will need to contact me for this prescriotion.       Stress incontinence - leaking with activity such as laughing, exercise, coughing or sneezing  instructed on kegels (10x in a row, 2x a day- given info on this today). If no improvement, can refer to pelvic floor physical therapy with dynamic in [place]  Check out [facility] pelvic health blog for tips  Timed voiding: void every 2 hours (keep bladder empty). The less urine in bladder the less leakage you may experience.   See above

## 2022-11-15 NOTE — Clinical Note
she has significant stress urinary incontinence seen on exam with an empty bladder however also a significant anterior prolapse.  Therefore I do not think that she would benefit from a sling by itself because it could can kink off her urethra causing obstruction.  Therefore I am referring her to Dr. Briseno, urogynecology to be evaluated for prolapse/stress incontinence.  In the meantime I am going to put her on overactive bladder medicines since a significant cystocele like that can worsen urinary urgency and frequency.  However it will not affect her stress incontinence and can actually worsened it if she holds her bladder

## 2022-11-18 DIAGNOSIS — Z12.31 ENCOUNTER FOR SCREENING MAMMOGRAM FOR MALIGNANT NEOPLASM OF BREAST: Primary | ICD-10-CM

## 2022-11-22 DIAGNOSIS — Z78.0 ASYMPTOMATIC MENOPAUSAL STATE: ICD-10-CM

## 2022-11-22 DIAGNOSIS — Z13.820 ENCOUNTER FOR SCREENING FOR OSTEOPOROSIS: Primary | ICD-10-CM

## 2022-11-23 ENCOUNTER — OFFICE VISIT (OUTPATIENT)
Dept: UROGYNECOLOGY | Facility: CLINIC | Age: 52
End: 2022-11-23
Payer: COMMERCIAL

## 2022-11-23 VITALS
HEART RATE: 79 BPM | WEIGHT: 130.06 LBS | DIASTOLIC BLOOD PRESSURE: 83 MMHG | SYSTOLIC BLOOD PRESSURE: 134 MMHG | HEIGHT: 67 IN | BODY MASS INDEX: 20.41 KG/M2

## 2022-11-23 DIAGNOSIS — N39.3 SUI (STRESS URINARY INCONTINENCE, FEMALE): ICD-10-CM

## 2022-11-23 DIAGNOSIS — N81.2 CYSTOCELE AND RECTOCELE WITH INCOMPLETE UTEROVAGINAL PROLAPSE: Primary | ICD-10-CM

## 2022-11-23 PROCEDURE — 99204 PR OFFICE/OUTPT VISIT, NEW, LEVL IV, 45-59 MIN: ICD-10-PCS | Mod: S$GLB,,, | Performed by: OBSTETRICS & GYNECOLOGY

## 2022-11-23 PROCEDURE — 99999 PR PBB SHADOW E&M-EST. PATIENT-LVL V: CPT | Mod: PBBFAC,,, | Performed by: OBSTETRICS & GYNECOLOGY

## 2022-11-23 PROCEDURE — 99999 PR PBB SHADOW E&M-EST. PATIENT-LVL V: ICD-10-PCS | Mod: PBBFAC,,, | Performed by: OBSTETRICS & GYNECOLOGY

## 2022-11-23 PROCEDURE — 99204 OFFICE O/P NEW MOD 45 MIN: CPT | Mod: S$GLB,,, | Performed by: OBSTETRICS & GYNECOLOGY

## 2022-11-23 NOTE — PROGRESS NOTES
Subjective:      Patient ID: Glenis Ruggiero is a 52 y.o. female.    Chief Complaint:  No chief complaint on file.      History of Present Illness  52-year-old multipara is referred secondary to pelvic organ prolapse patient was evaluated by colleague in Urology for urinary incontinence but this can not be done given the substantial defect of the anterior horn performed patient is sent for further referral patient with leakage.  Additionally patient notes that her bottom is out of position it is bothersome          Past Medical History:   Diagnosis Date    ADD (attention deficit disorder)     Anxiety     Depression     Fibromyalgia     Scoliosis     Ulcerative colitis        Past Surgical History:   Procedure Laterality Date    AUGMENTATION OF BREAST      BREAST SURGERY      implants and lift    NASAL SEPTUM SURGERY      TONSILLECTOMY         GYN & OB History  Patient's last menstrual period was 2014.   Date of Last Pap: No result found    OB History    Para Term  AB Living   3 3 3         SAB IAB Ectopic Multiple Live Births                  # Outcome Date GA Lbr Marco Antonio/2nd Weight Sex Delivery Anes PTL Lv   3 Term            2 Term            1 Term                Health Maintenance         Date Due Completion Date    COVID-19 Vaccine (1) Never done ---    TETANUS VACCINE Never done ---    Colorectal Cancer Screening Never done ---    Cervical Cancer Screening 2018    Shingles Vaccine (1 of 2) Never done ---    Lipid Panel 2019    Mammogram 2022    Influenza Vaccine (1) 2022            Family History   Problem Relation Age of Onset    Ephraim syndrome Son     Thyroid disease Maternal Aunt     Breast cancer Maternal Aunt     Cancer Maternal Aunt 45        breast cancer    Lupus Neg Hx     Psoriasis Neg Hx     Rheum arthritis Neg Hx        Social History     Socioeconomic History    Marital status:    Tobacco Use    Smoking status:  "Former    Smokeless tobacco: Never    Tobacco comments:     24 year old son with Jamaal's disease which causes mental retardation; she is ; 3 children total   Substance and Sexual Activity    Alcohol use: Yes     Comment: Couple drinks every 2 weeks    Drug use: No    Sexual activity: Yes     Partners: Male       Review of Systems  Review of Systems  Leakage of urine as well as pelvic organ distortion     Objective:   /83   Pulse 79   Ht 5' 7" (1.702 m)   Wt 59 kg (130 lb 1.1 oz)   LMP 08/18/2014   BMI 20.37 kg/m²     Physical Exam   Stage III anterior compartment defect where apex is coming down to within 3 cm of the opening of the introitus posterior defect stage I  Assessment:     1. Cystocele and rectocele with incomplete uterovaginal prolapse    2. GIACOMO (stress urinary incontinence, female)            Plan:     1. Cystocele and rectocele with incomplete uterovaginal prolapse    2. GIACOMO (stress urinary incontinence, female)    Long discussion with patient regarding my findings I am going to move forward with presentation of her anatomy comparison to normal anatomy utilizing [employer] [facility] and reactive upset from there I then utilized the animations to discuss management of stress urinary incontinence.  We also talked about urodynamics.    This point we need to go for surgical correction 1st then deal with incontinence.    Surgical correction can be in the form of either  1. Pessary which patient does not wish to embark on versus native tissue repair and vaginal hysterectomy with concomitant anterior and posterior repairs with sacral spinous or uterosacral ligament fixation I compared this approach to   Graft augmented abdominal approach with robotic assistance supracervical hysterectomy bilateral salpingectomy with ovarian preservation as well as robotic sacral colpopexy patient will take all this under advisement and let me know many questions were asked all were addressed but most " prominent patient notes that this is going to be an interval approach can not be done at once and I did refer to the studies supporting this approach questions skin dressed    Patient Instructions                          Expectant Management:  Patient understands we will continue to monitor her level of anatomic distortion without any type of active intervention until a time where symptomatology can no longer be tolerated by patient and she wishes to have active management.    Conservative Therapy:  Patient understands she will be utilizing a a supportive device within the vagina which will need maintenance.  The clinic will support her in her maintenance of the pessary.  Patient understands this is an active process which will need her input to maintain the pessary properly.     Surgical Management:

## 2022-12-09 ENCOUNTER — HOSPITAL ENCOUNTER (EMERGENCY)
Facility: HOSPITAL | Age: 52
Discharge: HOME | End: 2022-12-09
Attending: EMERGENCY MEDICINE
Payer: COMMERCIAL

## 2022-12-09 VITALS
TEMPERATURE: 98 F | HEIGHT: 67 IN | OXYGEN SATURATION: 96 % | DIASTOLIC BLOOD PRESSURE: 87 MMHG | RESPIRATION RATE: 16 BRPM | SYSTOLIC BLOOD PRESSURE: 159 MMHG | BODY MASS INDEX: 22.61 KG/M2 | WEIGHT: 144.06 LBS | HEART RATE: 81 BPM

## 2022-12-09 DIAGNOSIS — R53.81 MALAISE: Primary | ICD-10-CM

## 2022-12-09 DIAGNOSIS — D18.03 LIVER HEMANGIOMA: ICD-10-CM

## 2022-12-09 DIAGNOSIS — R10.32 ABDOMINAL PAIN, CHRONIC, LEFT LOWER QUADRANT: ICD-10-CM

## 2022-12-09 DIAGNOSIS — G89.29 ABDOMINAL PAIN, CHRONIC, LEFT LOWER QUADRANT: ICD-10-CM

## 2022-12-09 DIAGNOSIS — R63.0 DECREASED APPETITE: ICD-10-CM

## 2022-12-09 LAB
ALBUMIN SERPL BCP-MCNC: 4 G/DL (ref 3.5–5.2)
ALP SERPL-CCNC: 77 U/L (ref 55–135)
ALT SERPL W/O P-5'-P-CCNC: 17 U/L (ref 10–44)
ANION GAP SERPL CALC-SCNC: 12 MMOL/L (ref 8–16)
AST SERPL-CCNC: 15 U/L (ref 10–40)
BASOPHILS # BLD AUTO: 0.03 K/UL (ref 0–0.2)
BASOPHILS NFR BLD: 0.3 % (ref 0–1.9)
BILIRUB SERPL-MCNC: 0.5 MG/DL (ref 0.1–1)
BILIRUB UR QL STRIP: NEGATIVE
BUN SERPL-MCNC: 16 MG/DL (ref 6–20)
CALCIUM SERPL-MCNC: 10.1 MG/DL (ref 8.7–10.5)
CHLORIDE SERPL-SCNC: 100 MMOL/L (ref 95–110)
CLARITY UR: CLEAR
CO2 SERPL-SCNC: 25 MMOL/L (ref 23–29)
COLOR UR: YELLOW
CREAT SERPL-MCNC: 0.8 MG/DL (ref 0.5–1.4)
DIFFERENTIAL METHOD: ABNORMAL
EOSINOPHIL # BLD AUTO: 0 K/UL (ref 0–0.5)
EOSINOPHIL NFR BLD: 0.3 % (ref 0–8)
ERYTHROCYTE [DISTWIDTH] IN BLOOD BY AUTOMATED COUNT: 11.8 % (ref 11.5–14.5)
EST. GFR  (NO RACE VARIABLE): >60 ML/MIN/1.73 M^2
GLUCOSE SERPL-MCNC: 93 MG/DL (ref 70–110)
GLUCOSE UR QL STRIP: NEGATIVE
HCT VFR BLD AUTO: 47 % (ref 37–48.5)
HGB BLD-MCNC: 16.3 G/DL (ref 12–16)
HGB UR QL STRIP: NEGATIVE
IMM GRANULOCYTES # BLD AUTO: 0.03 K/UL (ref 0–0.04)
IMM GRANULOCYTES NFR BLD AUTO: 0.3 % (ref 0–0.5)
KETONES UR QL STRIP: ABNORMAL
LEUKOCYTE ESTERASE UR QL STRIP: NEGATIVE
LIPASE SERPL-CCNC: 29 U/L (ref 4–60)
LYMPHOCYTES # BLD AUTO: 2.9 K/UL (ref 1–4.8)
LYMPHOCYTES NFR BLD: 27 % (ref 18–48)
MCH RBC QN AUTO: 32.4 PG (ref 27–31)
MCHC RBC AUTO-ENTMCNC: 34.7 G/DL (ref 32–36)
MCV RBC AUTO: 93 FL (ref 82–98)
MONOCYTES # BLD AUTO: 0.7 K/UL (ref 0.3–1)
MONOCYTES NFR BLD: 6.3 % (ref 4–15)
NEUTROPHILS # BLD AUTO: 7 K/UL (ref 1.8–7.7)
NEUTROPHILS NFR BLD: 65.8 % (ref 38–73)
NITRITE UR QL STRIP: NEGATIVE
NRBC BLD-RTO: 0 /100 WBC
PH UR STRIP: 5 [PH] (ref 5–8)
PLATELET # BLD AUTO: 334 K/UL (ref 150–450)
PMV BLD AUTO: 9.8 FL (ref 9.2–12.9)
POTASSIUM SERPL-SCNC: 4.1 MMOL/L (ref 3.5–5.1)
PROT SERPL-MCNC: 7.3 G/DL (ref 6–8.4)
PROT UR QL STRIP: NEGATIVE
RBC # BLD AUTO: 5.03 M/UL (ref 4–5.4)
SODIUM SERPL-SCNC: 137 MMOL/L (ref 136–145)
SP GR UR STRIP: 1.01 (ref 1–1.03)
URN SPEC COLLECT METH UR: ABNORMAL
UROBILINOGEN UR STRIP-ACNC: NEGATIVE EU/DL
WBC # BLD AUTO: 10.61 K/UL (ref 3.9–12.7)

## 2022-12-09 PROCEDURE — 63600175 PHARM REV CODE 636 W HCPCS: Performed by: NURSE PRACTITIONER

## 2022-12-09 PROCEDURE — 96375 TX/PRO/DX INJ NEW DRUG ADDON: CPT

## 2022-12-09 PROCEDURE — 81003 URINALYSIS AUTO W/O SCOPE: CPT | Performed by: NURSE PRACTITIONER

## 2022-12-09 PROCEDURE — 25500020 PHARM REV CODE 255

## 2022-12-09 PROCEDURE — 36415 COLL VENOUS BLD VENIPUNCTURE: CPT | Performed by: NURSE PRACTITIONER

## 2022-12-09 PROCEDURE — 99285 EMERGENCY DEPT VISIT HI MDM: CPT | Mod: 25

## 2022-12-09 PROCEDURE — 96374 THER/PROPH/DIAG INJ IV PUSH: CPT

## 2022-12-09 PROCEDURE — 25000003 PHARM REV CODE 250: Performed by: NURSE PRACTITIONER

## 2022-12-09 PROCEDURE — 80053 COMPREHEN METABOLIC PANEL: CPT | Performed by: NURSE PRACTITIONER

## 2022-12-09 PROCEDURE — 96361 HYDRATE IV INFUSION ADD-ON: CPT

## 2022-12-09 PROCEDURE — 83690 ASSAY OF LIPASE: CPT | Performed by: NURSE PRACTITIONER

## 2022-12-09 PROCEDURE — 85025 COMPLETE CBC W/AUTO DIFF WBC: CPT | Performed by: NURSE PRACTITIONER

## 2022-12-09 RX ORDER — DROPERIDOL 2.5 MG/ML
2.5 INJECTION, SOLUTION INTRAMUSCULAR; INTRAVENOUS
Status: COMPLETED | OUTPATIENT
Start: 2022-12-09 | End: 2022-12-09

## 2022-12-09 RX ORDER — DIPHENHYDRAMINE HYDROCHLORIDE 50 MG/ML
12.5 INJECTION INTRAMUSCULAR; INTRAVENOUS
Status: COMPLETED | OUTPATIENT
Start: 2022-12-09 | End: 2022-12-09

## 2022-12-09 RX ORDER — ONDANSETRON 4 MG/1
4 TABLET, ORALLY DISINTEGRATING ORAL EVERY 8 HOURS PRN
Qty: 15 TABLET | Refills: 0 | Status: SHIPPED | OUTPATIENT
Start: 2022-12-09 | End: 2022-12-14

## 2022-12-09 RX ORDER — DICYCLOMINE HYDROCHLORIDE 20 MG/1
20 TABLET ORAL 2 TIMES DAILY
Qty: 60 TABLET | Refills: 0 | Status: SHIPPED | OUTPATIENT
Start: 2022-12-09 | End: 2023-01-08

## 2022-12-09 RX ADMIN — SODIUM CHLORIDE, POTASSIUM CHLORIDE, SODIUM LACTATE AND CALCIUM CHLORIDE 1000 ML: 600; 310; 30; 20 INJECTION, SOLUTION INTRAVENOUS at 04:12

## 2022-12-09 RX ADMIN — SODIUM CHLORIDE 1000 ML: 0.9 INJECTION, SOLUTION INTRAVENOUS at 12:12

## 2022-12-09 RX ADMIN — DROPERIDOL 2.5 MG: 2.5 INJECTION, SOLUTION INTRAMUSCULAR; INTRAVENOUS at 12:12

## 2022-12-09 RX ADMIN — DIPHENHYDRAMINE HYDROCHLORIDE 12.5 MG: 50 INJECTION, SOLUTION INTRAMUSCULAR; INTRAVENOUS at 12:12

## 2022-12-09 RX ADMIN — IOHEXOL 40 ML: 350 INJECTION, SOLUTION INTRAVENOUS at 01:12

## 2022-12-09 NOTE — DISCHARGE INSTRUCTIONS
You were seen and evaluated in the ER today.  Your workup while you were here is reassuring for no acute causes of your symptoms.  Your labs and imaging are all unremarkable.  Your CT and ultrasound did show a possible liver hemangioma.  We recommend outpatient follow-up for further imaging.  Take Zofran as needed for nausea.  Increase fluids and bland diet.  Small sips and small meals may help improve your appetite.  Your symptoms could likely be due to increased stress.  Please take your medications as prescribed.  Discuss a change in medication from Adderall with your psychiatrist.  Please follow-up with your PCP as needed.  Please return to the ED for any worsening symptoms such as chest pain, shortness of breath, fever not controlled with Tylenol or ibuprofen or uncontrolled pain.      Our goal in the emergency department is to always give you outstanding care and exceptional service. You may receive a survey by mail or e-mail in the next week regarding your experience in our ED. We would greatly appreciate your completing and returning the survey. Your feedback provides us with a way to recognize our staff who give very good care and it helps us learn how to improve when your experience was below our aspiration of excellence.

## 2022-12-09 NOTE — ED PROVIDER NOTES
Source of History:  Patient, chart, family    Chief complaint:  Emesis (S/S since Tues (recently D/C'd Adderall after 10 yrs); reports decreased appetite and N/V (Hx of ulcerative colitis))      HPI:  Glenis Ruggiero is a 52 y.o. female with medical history of ADD, anxiety, depression, fibromyalgia, ulcerative colitis presenting with generalized abdominal pain worse in left lower quadrant.  Patient states she stopped her Adderall cold turkey 17 days ago.  Patient states symptoms worsened on Tuesday.  Patient states she initially thought that the Adderall and her Humira were causing flare-up of her ulcerative colitis.    This is the extent to the patients complaints today here in the emergency department.    ROS: As per HPI and below:  Constitutional: No fever.  No chills.  Eyes: No visual changes.  ENT: No sore throat. No ear pain    Cardiovascular: No chest pain.  Respiratory: No shortness of breath.  GI:  Positive for abdominal pain.  Positive for nausea.  Positive for vomiting.  Positive for decreased appetite.  Genitourinary: No abnormal urination.  Neurologic: No headache. No focal weakness.  No numbness.  MSK: no back pain.  Integument: No rashes or lesions.  Hematologic: No easy bruising.  Endocrine: No excessive thirst or urination.    Review of patient's allergies indicates:  No Known Allergies    PMH:  As per HPI and below:  Past Medical History:   Diagnosis Date    ADD (attention deficit disorder)     Anxiety     Depression     Fibromyalgia     Scoliosis     Ulcerative colitis      Past Surgical History:   Procedure Laterality Date    AUGMENTATION OF BREAST      BREAST SURGERY      implants and lift    NASAL SEPTUM SURGERY      TONSILLECTOMY         Social History     Tobacco Use    Smoking status: Former    Smokeless tobacco: Never    Tobacco comments:     24 year old son with Jamaal's disease which causes mental retardation; she is ; 3 children total   Substance Use Topics    Alcohol use: Yes      "Comment: Couple drinks every 2 weeks    Drug use: No       Physical Exam:    /73   Pulse 83   Temp 99.2 °F (37.3 °C) (Oral)   Resp 16   Ht 5' 7" (1.702 m)   Wt 65.4 kg (144 lb 1.1 oz)   LMP 08/18/2014   SpO2 100%   BMI 22.56 kg/m²   Nursing note and vital signs reviewed.  Constitutional:  Distressed due to pain.  Nontoxic  Eyes: No conjunctival injection.  Extraocular muscles are intact.  ENT: Oropharynx clear.  Normal phonation.  Cardiovascular: Regular rate and rhythm.  No murmurs. No gallops. No rubs  Respiratory: Clear to auscultation bilaterally.  Good air movement.  No wheezes.  No rhonchi. No rales. No accessory muscle use.  Abdomen:  Abdomen soft and nontender.  No rebound or guarding.  Bowel sounds within normal limits.  Musculoskeletal: Good range of motion all joints.  No deformities.  Neck supple.  No meningismus.  Skin: No rashes seen.  Good turgor.  No abrasions.  No ecchymoses.  Neuro: alert and oriented x3,  no focal neurological deficits.  Psych:  Anxious, tearful on exam, conversant    Labs that have been ordered have been independently reviewed and interpreted by myself.    Labs Reviewed   CBC W/ AUTO DIFFERENTIAL - Abnormal; Notable for the following components:       Result Value    Hemoglobin 16.3 (*)     MCH 32.4 (*)     All other components within normal limits   URINALYSIS, REFLEX TO URINE CULTURE - Abnormal; Notable for the following components:    Ketones, UA 1+ (*)     All other components within normal limits    Narrative:     Specimen Source->Urine   COMPREHENSIVE METABOLIC PANEL   LIPASE     Imaging Results              US Abdomen Limited (Final result)  Result time 12/09/22 15:58:39   Procedure changed from US Liver with Doppler (xpd)     Final result by Theron Ramírez MD (12/09/22 15:58:39)                   Impression:      No acute findings.  Liver lesion is suggestive of a hemangioma.  Liver mass protocol CT or MRI could be confirmatory if " indicated.      Electronically signed by: Theron Ramírez  Date:    12/09/2022  Time:    15:58               Narrative:    EXAMINATION:  US ABDOMEN LIMITED    CLINICAL HISTORY:  liver mass;    TECHNIQUE:  Limited ultrasound of the right upper quadrant of the abdomen (including pancreas, liver, gallbladder, common bile duct, and spleen) was performed.    COMPARISON:  12/09/2022    FINDINGS:  Liver: Normal in size, measuring 12.1 cm. Homogeneous echotexture. There is a 1.7 cm hyperechoic lesion along the right hepatic lobe.    Gallbladder: No calculi, wall thickening, or pericholecystic fluid.  No sonographic Fair's sign.    Biliary system: The common duct is not dilated, measuring 3 mm.  No intrahepatic ductal dilatation.    Spleen: Normal in size and echotexture, measuring 8.2 cm.    Miscellaneous: No upper abdominal ascites.                                       CT Abdomen Pelvis With Contrast (Final result)  Result time 12/09/22 14:09:44      Final result by Stan Walsh Jr., MD (12/09/22 14:09:44)                   Impression:      1.4 cm partial contrast enhancing mass of the right lobe of the liver.  This is a likely cavernous hemangioma but confirmation by MRI or liver ultrasound is recommended.  Diverticulosis coli without CT evidence of diverticulitis.  Otherwise negative CT of the abdomen and pelvis.      Electronically signed by: Stan Walsh MD  Date:    12/09/2022  Time:    14:09               Narrative:    EXAMINATION:  CT ABDOMEN PELVIS WITH CONTRAST    CLINICAL HISTORY:  LLQ abdominal pain;Abdominal pain, acute, nonlocalized;    TECHNIQUE:  Low dose axial images, sagittal and coronal reformations were obtained from the lung bases to the pubic symphysis following the IV administration of 40 mL of Omnipaque 350    COMPARISON:  CT abdomen of August 19, 2022.    FINDINGS:  The liver is of normal size contour and general CT density.  In segment 6 of the liver parenchyma is a 1.4 cm lesion which  shows partial contrast enhancement.  In retrospect this was nearly fully contrast enhanced on the prior CT in the finding is consistent with a cavernous hemangioma.  Correlation with liver ultrasound or MRI of the liver is recommended however.  Other focal liver masses are not seen.  The gallbladder is of normal size without CT evidence of stone.  The pancreas is of normal contour and CT density without edema or mass.  The spleen is of normal size and CT density.    The adrenal glands are not enlarged.  The kidneys are of normal size contour and contrast enhancement without mass stone or hydronephrosis.  The abdominal aorta and inferior vena cava are of normal caliber.    The stomach is of normal configuration.  Small bowel dilatation or air-fluid levels are not seen.  The colon appears of normal configuration without distention or mass.  There is diverticulosis noted in the sigmoid colon without CT evidence of diverticulitis.  Free fluid or free air in the peritoneum is not seen.    The bladder is of normal contour without mass or asymmetry.  The uterus and ovaries are not enlarged.  No free fluid or adenopathy is noted.                                    I decided to obtain the patient's medical records.      MDM/ Differential Dx:   Emergent evaluation of a 51 yo female presenting for left lower quadrant abdominal pain with associated nausea vomiting.  Patient states she stopped her Adderall 17 days ago cold turkey.  Patient states she has had nausea vomiting since that time.  Patient states on Tuesday pain worsened.  Patient also endorses decreased appetite.  Patient states she quit her Adderall due to concern for becoming addicted.  On exam pt is A&Ox3. VSS.  Patient is tearful and anxious appearing on exam.  Distressed due to pain.  Nonfebrile and nontoxic appearing.  Mucous membranes pink and moist.  Breath sounds clear bilaterally.  Abdomen soft and nontender. No rebound or guarding appreciated on exam.    "BS WNL.  Pt speaking in full sentences.  Steady gait appreciated. Cap refill < 3 seconds.      Differential diagnoses include but are not limited to UTI, viral illness, interstitial colitis, IBS, gastroenteritis, influenza, colitis, gastritis, diverticulitis, medication withdrawal, others.      I will get labs, imaging, hydrate, medicate and reassess.  I discussed this case with my supervising physician.    ED Course as of 12/09/22 1659   Fri Dec 09, 2022   1510 CBC unremarkable.  No leukocytosis noted.  H&H stable 16.3 and 47.  CMP unremarkable.  Lipase negative.  UA negative for any acute infectious process.  CT concerning for 1.4 cm partial contrast enhancing mass of the right lobe of the liver, likely cavernous hemangioma.  Recommends Liver US.  Will order imaging and await results.   [RZ]   1656 Ultrasound shows possible liver hemangioma.  Patient reassessed.  Patient updated on results.  Patient states she "just does not feel well".  Patient advised this could be due to stopping her Humira and could also be stress induced.  Updated on lab and imaging results.  Reviewed everything thoroughly.  Advised we will discharge home with Zofran and Bentyl for abdominal pain.  Offered referrals for GI, pain management but patient refused at this time.  Patient advised she needs to follow up with her psychiatrist to discuss possible alternatives to Adderall.  Strict return to ED precautions discussed.  Patient verbalized understanding of this plan of care.  All questions and concerns addressed. [RZ]   1659 Patient is hemodynamically stable, vital signs are normal. Discharge instructions given. Return to ED precautions discussed. Follow up as directed. Pt verbalized understanding of this plan.  Pt is stable for discharge.  [RZ]      ED Course User Index  [RZ] Clarice Duncan NP               Diagnostic Impression:    1. Malaise    2. Abdominal pain, chronic, left lower quadrant    3. Decreased appetite    4. Liver " hemangioma         ED Disposition Condition    Discharge Stable            ED Prescriptions       Medication Sig Dispense Start Date End Date Auth. Provider    ondansetron (ZOFRAN-ODT) 4 MG TbDL Take 1 tablet (4 mg total) by mouth every 8 (eight) hours as needed (nausea). 15 tablet 12/9/2022 12/14/2022 Clarice Duncan NP    dicyclomine (BENTYL) 20 mg tablet Take 1 tablet (20 mg total) by mouth 2 (two) times daily. 60 tablet 12/9/2022 1/8/2023 Clarice Duncan NP          Follow-up Information       Follow up With Specialties Details Why Contact Info    Snehal Grullon NP Family Medicine Schedule an appointment as soon as possible for a visit   [address] [place] [address] [place] [address]  [phone]                 Clarice Duncan NP  12/09/22 3153

## 2022-12-09 NOTE — ED NOTES
Assumed care    Patient presents to ED with left side abdominal pain and nausea for 1 week. Patient states she has not eaten in 5 days. Patient stopped taking Adderall due to shortage on November 22 and patient thinks her pain is from abruptly stopping Adderall. Patient denies chest pain or shortness of breath. Denies constipation or diarrhea. Last BM was yesterday. Patient tearful at this time. No numbness or tingling in extremities. AAOx4.  at bedside.

## 2023-01-18 ENCOUNTER — HOSPITAL ENCOUNTER (OUTPATIENT)
Dept: RADIOLOGY | Facility: HOSPITAL | Age: 53
Discharge: HOME | End: 2023-01-18
Attending: NURSE PRACTITIONER

## 2023-01-18 ENCOUNTER — HOSPITAL ENCOUNTER (OUTPATIENT)
Dept: RADIOLOGY | Facility: HOSPITAL | Age: 53
Discharge: HOME | End: 2023-01-18
Attending: NURSE PRACTITIONER
Payer: COMMERCIAL

## 2023-01-18 VITALS — WEIGHT: 144.19 LBS | HEIGHT: 67 IN | BODY MASS INDEX: 22.63 KG/M2

## 2023-01-18 DIAGNOSIS — Z12.31 ENCOUNTER FOR SCREENING MAMMOGRAM FOR MALIGNANT NEOPLASM OF BREAST: ICD-10-CM

## 2023-01-18 DIAGNOSIS — Z13.820 ENCOUNTER FOR SCREENING FOR OSTEOPOROSIS: ICD-10-CM

## 2023-01-18 DIAGNOSIS — Z78.0 ASYMPTOMATIC MENOPAUSAL STATE: ICD-10-CM

## 2023-01-18 PROCEDURE — 77067 SCR MAMMO BI INCL CAD: CPT | Mod: TC,PO

## 2023-01-18 PROCEDURE — 77080 DXA BONE DENSITY AXIAL: CPT | Mod: TC,PO

## 2023-01-23 ENCOUNTER — TELEPHONE (OUTPATIENT)
Dept: UROGYNECOLOGY | Facility: CLINIC | Age: 53
End: 2023-01-23
Payer: COMMERCIAL

## 2023-01-23 DIAGNOSIS — R92.8 ABNORMAL MAMMOGRAM: Primary | ICD-10-CM

## 2023-01-23 NOTE — TELEPHONE ENCOUNTER
----- Message from Cristina Bonilla sent at 1/23/2023 11:39 AM CST -----  Regarding: paperwork  Contact: Patient  Type: Needs Medical Advice  Who Called:  Patient  Symptoms (please be specific):  paperwork pre op physical  How long has patient had these symptoms:    Pharmacy name and phone #:    Best Call Back Number: [phone]    Additional Information: Patient wants to know if she can come by and  the paperwork for surgery. Please call to advise. Thanks!

## 2023-01-23 NOTE — TELEPHONE ENCOUNTER
Called and left detailed message that we do not have any paperwork before hand that we will go over everything on 2/1 and give her a folder and go over it that day.

## 2023-02-01 ENCOUNTER — HOSPITAL ENCOUNTER (OUTPATIENT)
Dept: PREADMISSION TESTING | Facility: HOSPITAL | Age: 53
Discharge: HOME | End: 2023-02-01
Attending: OBSTETRICS & GYNECOLOGY
Payer: COMMERCIAL

## 2023-02-01 ENCOUNTER — OFFICE VISIT (OUTPATIENT)
Dept: UROGYNECOLOGY | Facility: CLINIC | Age: 53
End: 2023-02-01
Payer: COMMERCIAL

## 2023-02-01 VITALS — BODY MASS INDEX: 19.78 KG/M2 | HEIGHT: 67 IN | WEIGHT: 126 LBS

## 2023-02-01 VITALS
HEART RATE: 85 BPM | SYSTOLIC BLOOD PRESSURE: 127 MMHG | WEIGHT: 144.19 LBS | DIASTOLIC BLOOD PRESSURE: 83 MMHG | BODY MASS INDEX: 22.63 KG/M2 | HEIGHT: 67 IN

## 2023-02-01 DIAGNOSIS — N81.2 CYSTOCELE AND RECTOCELE WITH INCOMPLETE UTEROVAGINAL PROLAPSE: Primary | ICD-10-CM

## 2023-02-01 LAB
ABO + RH BLD: NORMAL
BLD GP AB SCN CELLS X3 SERPL QL: NORMAL

## 2023-02-01 PROCEDURE — 99213 PR OFFICE/OUTPT VISIT, EST, LEVL III, 20-29 MIN: ICD-10-PCS | Mod: S$GLB,,, | Performed by: OBSTETRICS & GYNECOLOGY

## 2023-02-01 PROCEDURE — 93010 ELECTROCARDIOGRAM REPORT: CPT | Mod: ,,, | Performed by: INTERNAL MEDICINE

## 2023-02-01 PROCEDURE — 99999 PR PBB SHADOW E&M-EST. PATIENT-LVL II: CPT | Mod: PBBFAC,,, | Performed by: OBSTETRICS & GYNECOLOGY

## 2023-02-01 PROCEDURE — 93010 EKG 12-LEAD: ICD-10-PCS | Mod: ,,, | Performed by: INTERNAL MEDICINE

## 2023-02-01 PROCEDURE — 99999 PR PBB SHADOW E&M-EST. PATIENT-LVL II: ICD-10-PCS | Mod: PBBFAC,,, | Performed by: OBSTETRICS & GYNECOLOGY

## 2023-02-01 PROCEDURE — 93005 ELECTROCARDIOGRAM TRACING: CPT

## 2023-02-01 PROCEDURE — 99900103 DSU ONLY-NO CHARGE-INITIAL HR (STAT)

## 2023-02-01 PROCEDURE — 36415 COLL VENOUS BLD VENIPUNCTURE: CPT | Performed by: ANESTHESIOLOGY

## 2023-02-01 PROCEDURE — 99213 OFFICE O/P EST LOW 20 MIN: CPT | Mod: S$GLB,,, | Performed by: OBSTETRICS & GYNECOLOGY

## 2023-02-01 PROCEDURE — 99900104 DSU ONLY-NO CHARGE-EA ADD'L HR (STAT)

## 2023-02-01 PROCEDURE — 86900 BLOOD TYPING SEROLOGIC ABO: CPT | Performed by: ANESTHESIOLOGY

## 2023-02-01 RX ORDER — DEXTROAMPHETAMINE SULFATE, DEXTROAMPHETAMINE SACCHARATE, AMPHETAMINE SULFATE AND AMPHETAMINE ASPARTATE 7.5; 7.5; 7.5; 7.5 MG/1; MG/1; MG/1; MG/1
CAPSULE, EXTENDED RELEASE ORAL EVERY MORNING
Status: ON HOLD | COMMUNITY
Start: 2022-12-23 | End: 2023-07-20 | Stop reason: CLARIF

## 2023-02-01 RX ORDER — VORTIOXETINE 10 MG/1
1 TABLET, FILM COATED ORAL NIGHTLY
COMMUNITY
Start: 2022-12-29

## 2023-02-01 RX ORDER — BUDESONIDE 3 MG/1
CAPSULE, COATED PELLETS ORAL
COMMUNITY
Start: 2023-01-03

## 2023-02-01 NOTE — DISCHARGE INSTRUCTIONS
To confirm, Your doctor has instructed you that surgery is scheduled for: 2/7/23 with Dr. Briseno    Please report to [facility], Registration the morning of surgery. You must check-in and receive a wristband before going to your procedure.    Pre-Op will call the afternoon prior to surgery between 1:00 and 6:00 PM with the final arrival time.  Phone number: [phone]    PLEASE NOTE:  The surgery schedule has many variables which may affect the time of your surgery case.  Family members should be available if your surgery time changes.  Plan to be here the day of your procedure between 4-6 hours.    MEDICATIONS:  TAKE ONLY THESE MEDICATIONS WITH A SMALL SIP OF WATER THE MORNING OF YOUR PROCEDURE:  KLONOPIN IF NEEDED, FLONASE, ZOFRAN IF NEEDED, RESTASIS, TRAMADOL IF NEEDED, TRINTELLIX, BUDESONIDE    NO ADDERALL, NO LISINOPRIL MORNING OF SURGERY BUT DO NOT STOP DAYS BEFORE.      DO NOT TAKE THESE MEDICATIONS 5-7 DAYS PRIOR to your procedure or per your surgeon's request:   ASPIRIN, ALEVE, ADVIL, IBUPROFEN, FISH OIL VITAMIN E, HERBALS  (May take Tylenol)    ONLY if you are prescribed any types of blood thinners such as:  Aspirin, Coumadin, Plavix, Pradaxa, Xarelto, Aggrenox, Effient, Eliquis, Savasya, Brilinta, or any other, ask your surgeon whether you should stop taking them and how long before surgery you should stop.  You may also need to verify with the prescribing physician if it is ok to stop your medication.      INSTRUCTIONS IMPORTANT!!  Do not eat or drink anything between midnight and the time of your procedure- this includes gum, mints, and candy.  Do not smoke or drink alcoholic beverages 24 hours prior to your procedure.  Shower the night before AND the morning of your procedure with a Chlorhexidine wash such as Hibiclens or Dial antibacterial soap from the neck down.  Do not get it on your face or in your eyes.  You may use your own shampoo and face wash. This helps your skin  to be as bacteria free as possible.    If you wear contact lenses, dentures, hearing aids or glasses, bring a container to put them in during surgery and give to a family member for safe keeping.  Please leave all jewelry, piercing's and valuables at home.   DO NOT remove hair from the surgery site.  Do not shave the incision site unless you are given specific instructions to do so.    ONLY if you have been diagnosed with sleep apnea please bring your C-PAP machine.  ONLY if you wear home oxygen please bring your portable oxygen tank the day of your procedure.  ONLY if you have a history of OPEN HEART SURGERY you will need a clearance from your Cardiologist per Anesthesia.      ONLY for patients requiring bowel prep, written instructions will be given by your doctor's office.  ONLY if you have a neuro stimulator, please bring the controller with you the morning of surgery  ONLY if a type and screen test is needed before surgery, please return:  If your doctor has scheduled you for an overnight stay, bring a small overnight bag with any personal items you need.  Make arrangements in advance for transportation home by a responsible adult.  It is not safe to drive a vehicle during the 24 hours after anesthesia.      [facility] Visitor Policy    Effective September 26, 2022    [facility] will resume routine visitation for COVID-19 negative patients, including inpatients, outpatients, and procedural areas, in accordance with local campus procedures.    All [facility] facilities and properties are tobacco free.  Smoking is NOT allowed.   If you have any questions about these instructions, call Pre-Op Admit  Nursing at [phone] or the Pre-Op Day Surgery Unit at [phone].

## 2023-02-01 NOTE — PROGRESS NOTES
Patient ID: Glenis Ruggiero is a 52 y.o. female.     Chief Complaint:  No chief complaint on file.        History of Present Illness  52-year-old multipara is referred secondary to pelvic organ prolapse patient was evaluated by colleague in Urology for urinary incontinence but this can not be done given the substantial defect of the anterior horn performed patient is sent for further referral patient with leakage.  Additionally patient notes that her bottom is out of position it is bothersome                   Past Medical History:   Diagnosis Date    ADD (attention deficit disorder)      Anxiety      Depression      Fibromyalgia      Scoliosis      Ulcerative colitis                 Past Surgical History:   Procedure Laterality Date    AUGMENTATION OF BREAST        BREAST SURGERY         implants and lift    NASAL SEPTUM SURGERY        TONSILLECTOMY             GYN & OB History  Patient's last menstrual period was 2014.   Date of Last Pap: No result found                      OB History    Para Term  AB Living   3 3 3         SAB IAB Ectopic Multiple Live Births                         # Outcome Date GA Lbr Marco Antonio/2nd Weight Sex Delivery Anes PTL Lv   3 Term                     2 Term                     1 Term                           Health Maintenance           Date Due Completion Date     COVID-19 Vaccine (1) Never done ---     TETANUS VACCINE Never done ---     Colorectal Cancer Screening Never done ---     Cervical Cancer Screening 2018     Shingles Vaccine (1 of 2) Never done ---     Lipid Panel 2019     Mammogram 2022     Influenza Vaccine (1) 2022                      Family History   Problem Relation Age of Onset    Ephraim syndrome Son      Thyroid disease Maternal Aunt      Breast cancer Maternal Aunt      Cancer Maternal Aunt 45         breast cancer    Lupus Neg Hx      Psoriasis Neg Hx      Rheum arthritis Neg Hx       "     Social History            Socioeconomic History    Marital status:    Tobacco Use    Smoking status: Former    Smokeless tobacco: Never    Tobacco comments:       24 year old son with Jamaal's disease which causes mental retardation; she is ; 3 children total   Substance and Sexual Activity    Alcohol use: Yes       Comment: Couple drinks every 2 weeks    Drug use: No    Sexual activity: Yes       Partners: Male         Review of Systems  Review of Systems  Leakage of urine as well as pelvic organ distortion     Objective:   /83   Pulse 79   Ht 5' 7" (1.702 m)   Wt 59 kg (130 lb 1.1 oz)   LMP 08/18/2014   BMI 20.37 kg/m²      Physical Exam   Stage III anterior compartment defect where apex is coming down to within 3 cm of the opening of the introitus posterior defect stage I  Assessment:      1. Cystocele and rectocele with incomplete uterovaginal prolapse    2. GIACOMO (stress urinary incontinence, female)             Plan:      1. Cystocele and rectocele with incomplete uterovaginal prolapse    2. GIACOMO (stress urinary incontinence, female)    Long discussion with patient regarding my findings I am going to move forward with presentation of her anatomy comparison to normal anatomy utilizing [employer] and reactive upset from there I then utilized the animations to discuss management of stress urinary incontinence.  We also talked about urodynamics.    This point we need to go for surgical correction 1st then deal with incontinence.    Surgical correction can be in the form of either  1. Pessary which patient does not wish to embark on versus native tissue repair and vaginal hysterectomy with concomitant anterior and posterior repairs with sacral spinous or uterosacral ligament fixation I compared this approach to   Graft augmented abdominal approach with robotic assistance supracervical hysterectomy bilateral salpingectomy with ovarian preservation as well as robotic sacral " colpopexy patient will take all this under advisement and let me know many questions were asked all were addressed but most prominent patient notes that this is going to be an interval approach can not be done at once and I did refer to the studies supporting this approach questions skin dressed    Review every aspect of surgery were going to move forward with reconstruction informed consent obtained

## 2023-02-02 ENCOUNTER — ANESTHESIA EVENT (OUTPATIENT)
Dept: SURGERY | Facility: HOSPITAL | Age: 53
End: 2023-02-02
Payer: COMMERCIAL

## 2023-02-02 DIAGNOSIS — Z01.818 PREOP TESTING: ICD-10-CM

## 2023-02-06 DIAGNOSIS — N81.2 CYSTOCELE AND RECTOCELE WITH INCOMPLETE UTEROVAGINAL PROLAPSE: Primary | ICD-10-CM

## 2023-02-06 RX ORDER — LIDOCAINE HYDROCHLORIDE 20 MG/ML
JELLY TOPICAL ONCE
Status: CANCELLED | OUTPATIENT
Start: 2023-02-06 | End: 2023-02-06

## 2023-02-07 ENCOUNTER — ANESTHESIA (OUTPATIENT)
Dept: SURGERY | Facility: HOSPITAL | Age: 53
End: 2023-02-07
Payer: COMMERCIAL

## 2023-02-07 ENCOUNTER — HOSPITAL ENCOUNTER (OUTPATIENT)
Facility: HOSPITAL | Age: 53
Discharge: HOME | End: 2023-02-08
Attending: OBSTETRICS & GYNECOLOGY | Admitting: OBSTETRICS & GYNECOLOGY
Payer: COMMERCIAL

## 2023-02-07 DIAGNOSIS — N81.2 CYSTOCELE AND RECTOCELE WITH INCOMPLETE UTEROVAGINAL PROLAPSE: ICD-10-CM

## 2023-02-07 PROBLEM — F32.A DEPRESSIVE DISORDER: Status: ACTIVE | Noted: 2017-12-06

## 2023-02-07 PROBLEM — F41.9 ANXIETY: Status: ACTIVE | Noted: 2023-02-07

## 2023-02-07 PROBLEM — K51.90 ULCERATIVE COLITIS: Status: ACTIVE | Noted: 2021-10-27

## 2023-02-07 PROBLEM — Z90.710 S/P HYSTERECTOMY: Status: ACTIVE | Noted: 2023-02-07

## 2023-02-07 PROCEDURE — 99900103 DSU ONLY-NO CHARGE-INITIAL HR (STAT): Performed by: OBSTETRICS & GYNECOLOGY

## 2023-02-07 PROCEDURE — 88305 TISSUE EXAM BY PATHOLOGIST: CPT | Mod: 26,,, | Performed by: PATHOLOGY

## 2023-02-07 PROCEDURE — 88305 TISSUE EXAM BY PATHOLOGIST: CPT | Performed by: PATHOLOGY

## 2023-02-07 PROCEDURE — C1781 MESH (IMPLANTABLE): HCPCS | Performed by: OBSTETRICS & GYNECOLOGY

## 2023-02-07 PROCEDURE — 25000003 PHARM REV CODE 250: Performed by: ANESTHESIOLOGY

## 2023-02-07 PROCEDURE — D9220A PRA ANESTHESIA: ICD-10-PCS | Mod: ANES,,, | Performed by: ANESTHESIOLOGY

## 2023-02-07 PROCEDURE — 37000009 HC ANESTHESIA EA ADD 15 MINS: Performed by: OBSTETRICS & GYNECOLOGY

## 2023-02-07 PROCEDURE — 36000713 HC OR TIME LEV V EA ADD 15 MIN: Performed by: OBSTETRICS & GYNECOLOGY

## 2023-02-07 PROCEDURE — 58542 PR LAP, SUPRACERVIAL HYSTERECTOMY W/ TUBE&OV, <250G: ICD-10-PCS | Mod: 51,,, | Performed by: OBSTETRICS & GYNECOLOGY

## 2023-02-07 PROCEDURE — 57425 PR LAPAROSCOPY, SURG, COLPOPEXY: ICD-10-PCS | Mod: ,,, | Performed by: OBSTETRICS & GYNECOLOGY

## 2023-02-07 PROCEDURE — D9220A PRA ANESTHESIA: Mod: CRNA,,, | Performed by: NURSE ANESTHETIST, CERTIFIED REGISTERED

## 2023-02-07 PROCEDURE — 37000008 HC ANESTHESIA 1ST 15 MINUTES: Performed by: OBSTETRICS & GYNECOLOGY

## 2023-02-07 PROCEDURE — 63600175 PHARM REV CODE 636 W HCPCS: Performed by: OBSTETRICS & GYNECOLOGY

## 2023-02-07 PROCEDURE — 63600175 PHARM REV CODE 636 W HCPCS: Performed by: ANESTHESIOLOGY

## 2023-02-07 PROCEDURE — 94761 N-INVAS EAR/PLS OXIMETRY MLT: CPT

## 2023-02-07 PROCEDURE — 25000003 PHARM REV CODE 250: Performed by: OBSTETRICS & GYNECOLOGY

## 2023-02-07 PROCEDURE — 99900104 DSU ONLY-NO CHARGE-EA ADD'L HR (STAT): Performed by: OBSTETRICS & GYNECOLOGY

## 2023-02-07 PROCEDURE — 71000039 HC RECOVERY, EACH ADD'L HOUR: Performed by: OBSTETRICS & GYNECOLOGY

## 2023-02-07 PROCEDURE — 63600175 PHARM REV CODE 636 W HCPCS: Performed by: NURSE ANESTHETIST, CERTIFIED REGISTERED

## 2023-02-07 PROCEDURE — 58542 LSH W/T/O UT 250 G OR LESS: CPT | Mod: 51,,, | Performed by: OBSTETRICS & GYNECOLOGY

## 2023-02-07 PROCEDURE — D9220A PRA ANESTHESIA: ICD-10-PCS | Mod: CRNA,,, | Performed by: NURSE ANESTHETIST, CERTIFIED REGISTERED

## 2023-02-07 PROCEDURE — 57425 LAPAROSCOPY SURG COLPOPEXY: CPT | Mod: ,,, | Performed by: OBSTETRICS & GYNECOLOGY

## 2023-02-07 PROCEDURE — 71000033 HC RECOVERY, INTIAL HOUR: Performed by: OBSTETRICS & GYNECOLOGY

## 2023-02-07 PROCEDURE — 99900035 HC TECH TIME PER 15 MIN (STAT)

## 2023-02-07 PROCEDURE — 25000003 PHARM REV CODE 250: Performed by: STUDENT IN AN ORGANIZED HEALTH CARE EDUCATION/TRAINING PROGRAM

## 2023-02-07 PROCEDURE — 25000003 PHARM REV CODE 250: Performed by: NURSE ANESTHETIST, CERTIFIED REGISTERED

## 2023-02-07 PROCEDURE — D9220A PRA ANESTHESIA: Mod: ANES,,, | Performed by: ANESTHESIOLOGY

## 2023-02-07 PROCEDURE — 94799 UNLISTED PULMONARY SVC/PX: CPT

## 2023-02-07 PROCEDURE — 27201423 OPTIME MED/SURG SUP & DEVICES STERILE SUPPLY: Performed by: OBSTETRICS & GYNECOLOGY

## 2023-02-07 PROCEDURE — 88305 TISSUE EXAM BY PATHOLOGIST: ICD-10-PCS | Mod: 26,,, | Performed by: PATHOLOGY

## 2023-02-07 PROCEDURE — 36000712 HC OR TIME LEV V 1ST 15 MIN: Performed by: OBSTETRICS & GYNECOLOGY

## 2023-02-07 DEVICE — MESH RESTORELLE Y 24X4CM: Type: IMPLANTABLE DEVICE | Site: PELVIS | Status: FUNCTIONAL

## 2023-02-07 RX ORDER — OXYCODONE AND ACETAMINOPHEN 10; 325 MG/1; MG/1
1 TABLET ORAL EVERY 4 HOURS PRN
Status: DISCONTINUED | OUTPATIENT
Start: 2023-02-07 | End: 2023-02-08 | Stop reason: HOSPADM

## 2023-02-07 RX ORDER — OXYCODONE AND ACETAMINOPHEN 5; 325 MG/1; MG/1
1 TABLET ORAL EVERY 4 HOURS PRN
Status: DISCONTINUED | OUTPATIENT
Start: 2023-02-07 | End: 2023-02-08 | Stop reason: HOSPADM

## 2023-02-07 RX ORDER — METHYLPHENIDATE HYDROCHLORIDE 5 MG/1
5 TABLET ORAL 2 TIMES DAILY WITH MEALS
Status: DISCONTINUED | OUTPATIENT
Start: 2023-02-07 | End: 2023-02-08 | Stop reason: HOSPADM

## 2023-02-07 RX ORDER — DIPHENHYDRAMINE HYDROCHLORIDE 50 MG/ML
25 INJECTION INTRAMUSCULAR; INTRAVENOUS EVERY 4 HOURS PRN
Status: DISCONTINUED | OUTPATIENT
Start: 2023-02-07 | End: 2023-02-08 | Stop reason: HOSPADM

## 2023-02-07 RX ORDER — POLYETHYLENE GLYCOL 3350 17 G/17G
17 POWDER, FOR SOLUTION ORAL DAILY
Status: DISCONTINUED | OUTPATIENT
Start: 2023-02-08 | End: 2023-02-08 | Stop reason: HOSPADM

## 2023-02-07 RX ORDER — DEXAMETHASONE SODIUM PHOSPHATE 4 MG/ML
INJECTION, SOLUTION INTRA-ARTICULAR; INTRALESIONAL; INTRAMUSCULAR; INTRAVENOUS; SOFT TISSUE
Status: DISCONTINUED | OUTPATIENT
Start: 2023-02-07 | End: 2023-02-07

## 2023-02-07 RX ORDER — HYDROMORPHONE HYDROCHLORIDE 1 MG/ML
1 INJECTION, SOLUTION INTRAMUSCULAR; INTRAVENOUS; SUBCUTANEOUS EVERY 4 HOURS PRN
Status: DISCONTINUED | OUTPATIENT
Start: 2023-02-07 | End: 2023-02-08 | Stop reason: HOSPADM

## 2023-02-07 RX ORDER — ONDANSETRON 4 MG/1
4 TABLET, FILM COATED ORAL EVERY 6 HOURS PRN
Status: DISCONTINUED | OUTPATIENT
Start: 2023-02-07 | End: 2023-02-07 | Stop reason: SDUPTHER

## 2023-02-07 RX ORDER — IBUPROFEN 600 MG/1
600 TABLET ORAL EVERY 6 HOURS
Status: DISCONTINUED | OUTPATIENT
Start: 2023-02-08 | End: 2023-02-08 | Stop reason: HOSPADM

## 2023-02-07 RX ORDER — MIDAZOLAM HYDROCHLORIDE 1 MG/ML
INJECTION INTRAMUSCULAR; INTRAVENOUS
Status: DISCONTINUED | OUTPATIENT
Start: 2023-02-07 | End: 2023-02-07

## 2023-02-07 RX ORDER — LORAZEPAM 2 MG/ML
0.5 INJECTION INTRAMUSCULAR EVERY 10 MIN PRN
Status: DISCONTINUED | OUTPATIENT
Start: 2023-02-07 | End: 2023-02-08 | Stop reason: HOSPADM

## 2023-02-07 RX ORDER — SODIUM CHLORIDE, SODIUM LACTATE, POTASSIUM CHLORIDE, CALCIUM CHLORIDE 600; 310; 30; 20 MG/100ML; MG/100ML; MG/100ML; MG/100ML
INJECTION, SOLUTION INTRAVENOUS CONTINUOUS
Status: DISCONTINUED | OUTPATIENT
Start: 2023-02-07 | End: 2023-02-07

## 2023-02-07 RX ORDER — SODIUM CHLORIDE, SODIUM LACTATE, POTASSIUM CHLORIDE, CALCIUM CHLORIDE 600; 310; 30; 20 MG/100ML; MG/100ML; MG/100ML; MG/100ML
INJECTION, SOLUTION INTRAVENOUS CONTINUOUS
Status: DISCONTINUED | OUTPATIENT
Start: 2023-02-07 | End: 2023-02-08 | Stop reason: HOSPADM

## 2023-02-07 RX ORDER — SUMATRIPTAN 50 MG/1
50 TABLET, FILM COATED ORAL ONCE
Status: DISCONTINUED | OUTPATIENT
Start: 2023-02-07 | End: 2023-02-08 | Stop reason: HOSPADM

## 2023-02-07 RX ORDER — ROCURONIUM BROMIDE 10 MG/ML
INJECTION, SOLUTION INTRAVENOUS
Status: DISCONTINUED | OUTPATIENT
Start: 2023-02-07 | End: 2023-02-07

## 2023-02-07 RX ORDER — POLYETHYLENE GLYCOL 3350 17 G/17G
17 POWDER, FOR SOLUTION ORAL DAILY
Status: DISCONTINUED | OUTPATIENT
Start: 2023-02-08 | End: 2023-02-07 | Stop reason: SDUPTHER

## 2023-02-07 RX ORDER — BISACODYL 10 MG
10 SUPPOSITORY, RECTAL RECTAL DAILY PRN
Status: DISCONTINUED | OUTPATIENT
Start: 2023-02-07 | End: 2023-02-08 | Stop reason: HOSPADM

## 2023-02-07 RX ORDER — ONDANSETRON HYDROCHLORIDE 2 MG/ML
INJECTION, SOLUTION INTRAMUSCULAR; INTRAVENOUS
Status: DISCONTINUED | OUTPATIENT
Start: 2023-02-07 | End: 2023-02-07

## 2023-02-07 RX ORDER — FLUTICASONE PROPIONATE 50 MCG
1 SPRAY, SUSPENSION (ML) NASAL DAILY
Status: DISCONTINUED | OUTPATIENT
Start: 2023-02-08 | End: 2023-02-08 | Stop reason: HOSPADM

## 2023-02-07 RX ORDER — LIDOCAINE HYDROCHLORIDE 10 MG/ML
1 INJECTION, SOLUTION EPIDURAL; INFILTRATION; INTRACAUDAL; PERINEURAL ONCE
Status: DISCONTINUED | OUTPATIENT
Start: 2023-02-07 | End: 2023-02-07

## 2023-02-07 RX ORDER — PROCHLORPERAZINE EDISYLATE 5 MG/ML
5 INJECTION INTRAMUSCULAR; INTRAVENOUS EVERY 6 HOURS PRN
Status: DISCONTINUED | OUTPATIENT
Start: 2023-02-07 | End: 2023-02-08 | Stop reason: HOSPADM

## 2023-02-07 RX ORDER — HYDROMORPHONE HYDROCHLORIDE 2 MG/ML
0.2 INJECTION, SOLUTION INTRAMUSCULAR; INTRAVENOUS; SUBCUTANEOUS EVERY 5 MIN PRN
Status: COMPLETED | OUTPATIENT
Start: 2023-02-07 | End: 2023-02-07

## 2023-02-07 RX ORDER — LIDOCAINE HCL/PF 100 MG/5ML
SYRINGE (ML) INTRAVENOUS
Status: DISCONTINUED | OUTPATIENT
Start: 2023-02-07 | End: 2023-02-07

## 2023-02-07 RX ORDER — OXYCODONE HYDROCHLORIDE 5 MG/1
5 TABLET ORAL EVERY 4 HOURS PRN
Status: DISCONTINUED | OUTPATIENT
Start: 2023-02-07 | End: 2023-02-07 | Stop reason: HOSPADM

## 2023-02-07 RX ORDER — CLONAZEPAM 0.5 MG/1
0.5 TABLET ORAL DAILY
Status: DISCONTINUED | OUTPATIENT
Start: 2023-02-08 | End: 2023-02-08 | Stop reason: HOSPADM

## 2023-02-07 RX ORDER — PHENYLEPHRINE HYDROCHLORIDE 10 MG/ML
INJECTION INTRAVENOUS
Status: DISCONTINUED | OUTPATIENT
Start: 2023-02-07 | End: 2023-02-07

## 2023-02-07 RX ORDER — FENTANYL CITRATE 50 UG/ML
INJECTION, SOLUTION INTRAMUSCULAR; INTRAVENOUS
Status: DISCONTINUED | OUTPATIENT
Start: 2023-02-07 | End: 2023-02-07

## 2023-02-07 RX ORDER — FENTANYL CITRATE 50 UG/ML
25 INJECTION, SOLUTION INTRAMUSCULAR; INTRAVENOUS EVERY 5 MIN PRN
Status: COMPLETED | OUTPATIENT
Start: 2023-02-07 | End: 2023-02-07

## 2023-02-07 RX ORDER — KETOROLAC TROMETHAMINE 30 MG/ML
30 INJECTION, SOLUTION INTRAMUSCULAR; INTRAVENOUS EVERY 6 HOURS
Status: DISCONTINUED | OUTPATIENT
Start: 2023-02-07 | End: 2023-02-08 | Stop reason: HOSPADM

## 2023-02-07 RX ORDER — LISINOPRIL 10 MG/1
10 TABLET ORAL DAILY
Status: DISCONTINUED | OUTPATIENT
Start: 2023-02-08 | End: 2023-02-08 | Stop reason: HOSPADM

## 2023-02-07 RX ORDER — CEFAZOLIN SODIUM 2 G/50ML
2 SOLUTION INTRAVENOUS
Status: COMPLETED | OUTPATIENT
Start: 2023-02-07 | End: 2023-02-07

## 2023-02-07 RX ORDER — MUPIROCIN 20 MG/G
1 OINTMENT TOPICAL 2 TIMES DAILY
Status: DISCONTINUED | OUTPATIENT
Start: 2023-02-07 | End: 2023-02-08 | Stop reason: HOSPADM

## 2023-02-07 RX ORDER — ACETAMINOPHEN 10 MG/ML
INJECTION, SOLUTION INTRAVENOUS
Status: DISCONTINUED | OUTPATIENT
Start: 2023-02-07 | End: 2023-02-07

## 2023-02-07 RX ORDER — DIPHENHYDRAMINE HCL 25 MG
25 CAPSULE ORAL EVERY 4 HOURS PRN
Status: DISCONTINUED | OUTPATIENT
Start: 2023-02-07 | End: 2023-02-08 | Stop reason: HOSPADM

## 2023-02-07 RX ORDER — ONDANSETRON 4 MG/1
8 TABLET, ORALLY DISINTEGRATING ORAL EVERY 8 HOURS PRN
Status: DISCONTINUED | OUTPATIENT
Start: 2023-02-07 | End: 2023-02-08 | Stop reason: HOSPADM

## 2023-02-07 RX ORDER — SODIUM CHLORIDE 0.9 % (FLUSH) 0.9 %
10 SYRINGE (ML) INJECTION
Status: DISCONTINUED | OUTPATIENT
Start: 2023-02-07 | End: 2023-02-07 | Stop reason: HOSPADM

## 2023-02-07 RX ORDER — OXYCODONE HYDROCHLORIDE 5 MG/1
5 TABLET ORAL
Status: DISCONTINUED | OUTPATIENT
Start: 2023-02-07 | End: 2023-02-07 | Stop reason: HOSPADM

## 2023-02-07 RX ORDER — PROPOFOL 10 MG/ML
VIAL (ML) INTRAVENOUS
Status: DISCONTINUED | OUTPATIENT
Start: 2023-02-07 | End: 2023-02-07

## 2023-02-07 RX ADMIN — HYDROMORPHONE HYDROCHLORIDE 0.2 MG: 2 INJECTION, SOLUTION INTRAMUSCULAR; INTRAVENOUS; SUBCUTANEOUS at 01:02

## 2023-02-07 RX ADMIN — ROCURONIUM BROMIDE 10 MG: 10 INJECTION, SOLUTION INTRAVENOUS at 10:02

## 2023-02-07 RX ADMIN — FENTANYL CITRATE 50 MCG: 0.05 INJECTION, SOLUTION INTRAMUSCULAR; INTRAVENOUS at 11:02

## 2023-02-07 RX ADMIN — MIDAZOLAM HYDROCHLORIDE 2 MG: 1 INJECTION, SOLUTION INTRAMUSCULAR; INTRAVENOUS at 10:02

## 2023-02-07 RX ADMIN — FENTANYL CITRATE 25 MCG: 50 INJECTION INTRAMUSCULAR; INTRAVENOUS at 01:02

## 2023-02-07 RX ADMIN — SODIUM CHLORIDE, POTASSIUM CHLORIDE, SODIUM LACTATE AND CALCIUM CHLORIDE: 600; 310; 30; 20 INJECTION, SOLUTION INTRAVENOUS at 11:02

## 2023-02-07 RX ADMIN — SODIUM CHLORIDE, POTASSIUM CHLORIDE, SODIUM LACTATE AND CALCIUM CHLORIDE: 600; 310; 30; 20 INJECTION, SOLUTION INTRAVENOUS at 01:02

## 2023-02-07 RX ADMIN — KETOROLAC TROMETHAMINE 30 MG: 30 INJECTION, SOLUTION INTRAMUSCULAR; INTRAVENOUS at 05:02

## 2023-02-07 RX ADMIN — SODIUM CHLORIDE, SODIUM GLUCONATE, SODIUM ACETATE, POTASSIUM CHLORIDE AND MAGNESIUM CHLORIDE: 526; 502; 368; 37; 30 INJECTION, SOLUTION INTRAVENOUS at 11:02

## 2023-02-07 RX ADMIN — ROCURONIUM BROMIDE 40 MG: 10 INJECTION, SOLUTION INTRAVENOUS at 10:02

## 2023-02-07 RX ADMIN — PHENYLEPHRINE HYDROCHLORIDE 100 MCG: 10 INJECTION INTRAVENOUS at 10:02

## 2023-02-07 RX ADMIN — LIDOCAINE HYDROCHLORIDE 80 MG: 20 INJECTION INTRAVENOUS at 10:02

## 2023-02-07 RX ADMIN — ACETAMINOPHEN 1000 MG: 10 INJECTION, SOLUTION INTRAVENOUS at 10:02

## 2023-02-07 RX ADMIN — PROPOFOL 130 MG: 10 INJECTION, EMULSION INTRAVENOUS at 10:02

## 2023-02-07 RX ADMIN — CEFAZOLIN SODIUM 2 G: 2 SOLUTION INTRAVENOUS at 10:02

## 2023-02-07 RX ADMIN — PROMETHAZINE HYDROCHLORIDE 6.25 MG: 25 INJECTION INTRAMUSCULAR; INTRAVENOUS at 01:02

## 2023-02-07 RX ADMIN — ONDANSETRON 4 MG: 2 INJECTION INTRAMUSCULAR; INTRAVENOUS at 12:02

## 2023-02-07 RX ADMIN — CONJUGATED ESTROGENS 0.5 G: 0.62 CREAM VAGINAL at 10:02

## 2023-02-07 RX ADMIN — FENTANYL CITRATE 50 MCG: 0.05 INJECTION, SOLUTION INTRAMUSCULAR; INTRAVENOUS at 10:02

## 2023-02-07 RX ADMIN — ROCURONIUM BROMIDE 20 MG: 10 INJECTION, SOLUTION INTRAVENOUS at 12:02

## 2023-02-07 RX ADMIN — HYDROMORPHONE HYDROCHLORIDE 0.2 MG: 2 INJECTION, SOLUTION INTRAMUSCULAR; INTRAVENOUS; SUBCUTANEOUS at 02:02

## 2023-02-07 RX ADMIN — DEXAMETHASONE SODIUM PHOSPHATE 8 MG: 4 INJECTION, SOLUTION INTRA-ARTICULAR; INTRALESIONAL; INTRAMUSCULAR; INTRAVENOUS; SOFT TISSUE at 10:02

## 2023-02-07 RX ADMIN — KETOROLAC TROMETHAMINE 30 MG: 30 INJECTION, SOLUTION INTRAMUSCULAR; INTRAVENOUS at 11:02

## 2023-02-07 RX ADMIN — SODIUM CHLORIDE, SODIUM GLUCONATE, SODIUM ACETATE, POTASSIUM CHLORIDE AND MAGNESIUM CHLORIDE: 526; 502; 368; 37; 30 INJECTION, SOLUTION INTRAVENOUS at 09:02

## 2023-02-07 RX ADMIN — LORAZEPAM 0.5 MG: 2 INJECTION INTRAMUSCULAR; INTRAVENOUS at 01:02

## 2023-02-07 RX ADMIN — SUGAMMADEX 200 MG: 100 INJECTION, SOLUTION INTRAVENOUS at 12:02

## 2023-02-07 RX ADMIN — OXYCODONE AND ACETAMINOPHEN 1 TABLET: 10; 325 TABLET ORAL at 09:02

## 2023-02-07 RX ADMIN — FENTANYL CITRATE 50 MCG: 0.05 INJECTION, SOLUTION INTRAMUSCULAR; INTRAVENOUS at 12:02

## 2023-02-07 RX ADMIN — OXYCODONE 5 MG: 5 TABLET ORAL at 01:02

## 2023-02-07 NOTE — PLAN OF CARE
Still restless and anxious at intervals dr Donaldson called updated and orders received for Ativan

## 2023-02-07 NOTE — H&P
[facility] Hospital Medicine  History & Physical    Patient Name: Glenis Ruggiero  MRN: [id number]  Patient Class: OP- Outpatient Recovery  Admission Date: 2/7/2023  Attending Physician: Scott Flores MD  Primary Care Provider: Snehal Grullon NP         Patient information was obtained from past medical records and ER records.     Subjective:     Principal Problem:S/P hysterectomy    Chief Complaint: No chief complaint on file.       HPI: Glenis Ruggiero is a 52 year old female with a past medical history of pelvic organ prolapse, DWIGHT, ADD, UC, and fibromyalgia who presented for elective robotic hysterectomy and sacrocolpopexy per Dr. Briseno. The patient tolerated the procedure well and there were no intraoperative complications. She is unable to provide history at this time. She will be monitored overnight postoperatively.      Past Medical History:   Diagnosis Date    ADD (attention deficit disorder)     Anxiety     Depression     Fibromyalgia     Prediabetes     Scoliosis     Ulcerative colitis        Past Surgical History:   Procedure Laterality Date    AUGMENTATION OF BREAST      BREAST SURGERY      implants and lift    NASAL SEPTUM SURGERY      TONSILLECTOMY         Review of patient's allergies indicates:  No Known Allergies    No current facility-administered medications on file prior to encounter.     Current Outpatient Medications on File Prior to Encounter   Medication Sig    adalimumab (HUMIRA,CF,) 40 mg/0.4 mL SyKt Humira(CF) 40 mg/0.4 mL subcutaneous syringe kit    clonazePAM (KLONOPIN) 0.5 MG tablet clonazepam 0.5 mg tablet   TAKE 1 TABLET BY MOUTH EVERY DAY AS NEEDED    fluticasone (FLONASE) 50 mcg/actuation nasal spray 1 spray by Each Nare route once daily.    naratriptan (AMERGE) 2.5 MG tablet as needed.    progesterone (PROMETRIUM) 100 MG capsule every evening.    RESTASIS 0.05 % ophthalmic emulsion     tramadol (ULTRAM) 50 mg tablet Take 50 mg by mouth 2 (two)  times daily as needed.    estradioL (EVAMIST) 1.53 mg/spray (1.7%) transdermal spray Evamist 1.53 mg/spray (1.7 %) transdermal spray   APPLY ONE SPRAY EVERY DAY BY TOPICAL ROUTE    insulin detemir U-100 (LEVEMIR FLEXTOUCH U-100 INSULN) 100 unit/mL (3 mL) InPn pen Levemir FlexTouch U-100 Insulin 100 unit/mL (3 mL) subcutaneous pen    lisinopriL 10 MG tablet prn    mesalamine (APRISO) 0.375 gram Cp24 Take 3 capsules by mouth once daily.    minocycline (MINOCIN,DYNACIN) 50 MG Cap Take 1 capsule by mouth 2 (two) times a day. prn    mupirocin (BACTROBAN) 2 % ointment 3 (three) times daily. Apply to affected area    ondansetron (ZOFRAN) 4 MG tablet ondansetron HCl 4 mg tablet   TAKE 1 TABLET BY MOUTH EVERY DAY AS NEEDED    testosterone (ANDROGEL) 1 % (50 mg/5 gram) GlPk testosterone 50 mg/5 gram (1 %) transdermal gel   APPLY EXTERNALLY TO SPECIFIC AREA OF SKIN    triamcinolone acetonide 0.1% (KENALOG) 0.1 % paste      Family History       Problem Relation (Age of Onset)    Breast cancer Maternal Aunt    Cancer Maternal Aunt (45)    Thyroid disease Maternal Aunt    Ephraim syndrome Son          Tobacco Use    Smoking status: Some Days     Types: Cigarettes, Vaping with nicotine    Smokeless tobacco: Never    Tobacco comments:     24 year old son with Jamaal's disease which causes mental retardation; she is ; 3 children total   Substance and Sexual Activity    Alcohol use: Yes     Comment: Couple drinks every 2 weeks    Drug use: No    Sexual activity: Yes     Partners: Male     Review of Systems   Unable to perform ROS: Mental status change   Objective:     Vital Signs (Most Recent):  Temp: 98.1 °F (36.7 °C) (02/07/23 1445)  Pulse: (!) 56 (02/07/23 1545)  Resp: 16 (02/07/23 1545)  BP: 127/65 (02/07/23 1545)  SpO2: 98 % (02/07/23 1545)   Vital Signs (24h Range):  Temp:  [97.3 °F (36.3 °C)-98.1 °F (36.7 °C)] 98.1 °F (36.7 °C)  Pulse:  [52-71] 56  Resp:  [10-25] 16  SpO2:  [89 %-100 %] 98 %  BP:  (114-173)/(53-79) 127/65     Weight: 55.8 kg (123 lb)  Body mass index is 19.26 kg/m².    Physical Exam  Vitals and nursing note reviewed.   Constitutional:       General: She is not in acute distress.     Appearance: She is normal weight.   HENT:      Head: Normocephalic and atraumatic.      Right Ear: External ear normal.      Left Ear: External ear normal.      Nose: Nose normal.      Mouth/Throat:      Mouth: Mucous membranes are moist.      Pharynx: Oropharynx is clear.   Eyes:      Extraocular Movements: Extraocular movements intact.      Conjunctiva/sclera: Conjunctivae normal.   Cardiovascular:      Rate and Rhythm: Normal rate and regular rhythm.      Pulses: Normal pulses.      Heart sounds: Normal heart sounds.   Pulmonary:      Effort: Pulmonary effort is normal.      Breath sounds: Normal breath sounds.   Abdominal:      General: Bowel sounds are normal. There is no distension.      Palpations: Abdomen is soft.      Tenderness: There is no abdominal tenderness.   Genitourinary:     Comments: Kang.  Musculoskeletal:      Cervical back: Normal range of motion and neck supple.      Right lower leg: No edema.      Left lower leg: No edema.   Skin:     General: Skin is dry.   Neurological:      Mental Status: She is lethargic and disoriented.           Significant Labs: All pertinent labs within the past 24 hours have been reviewed.    Significant Imaging: I have reviewed all pertinent imaging results/findings within the past 24 hours.    Assessment/Plan:     * S/P hysterectomy  Robotic. With sacrocolpopexy per Dr. Briseno.  -Monitor overnight  -Kang  -PRN antiemetics and analgesics  -Incentive spirometry      Anxiety  -Continue PRN Klonopin      Ulcerative colitis  -Continue home mesalamine      ADD (attention deficit disorder)  -Continue home Adderall        VTE Risk Mitigation (From admission, onward)         Ordered     IP VTE LOW RISK PATIENT  Once         02/07/23 0850     Place PEPE hose  Until  discontinued         02/07/23 0850     Place sequential compression device  Until discontinued         02/07/23 0850                   Scott Flores MD  Department of Hospital Medicine   [facility]

## 2023-02-07 NOTE — ANESTHESIA PROCEDURE NOTES
Intubation    Date/Time: 2/7/2023 10:22 AM  Performed by: Erasmo Mi CRNA  Authorized by: Marla Walsh MD     Intubation:     Induction:  Intravenous    Intubated:  Postinduction    Mask Ventilation:  Easy mask    Attempts:  2    Attempted By:  Student    Method of Intubation:  Direct    Blade:  Peng 2    Laryngeal View Grade: Grade I - full view of cords      Attempted By (2nd Attempt):  Student    Method of Intubation (2nd Attempt):  Direct    Blade (2nd Attempt):  Peng 2    Laryngeal View Grade (2nd Attempt): Grade I - full view of cords      Difficult Airway Encountered?: No      Complications:  None    Airway Device:  Oral endotracheal tube    Airway Device Size:  7.0    Style/Cuff Inflation:  Cuffed    Inflation Amount (mL):  7    Tube secured:  21    Secured at:  The lips    Placement Verified By:  Capnometry and Revisualization with laryngoscopy    Complicating Factors:  None    Findings Post-Intubation:  BS equal bilateral and atraumatic/condition of teeth unchanged  Notes:      Ventilated patient between intubation attempts.

## 2023-02-07 NOTE — ANESTHESIA PREPROCEDURE EVALUATION
02/07/2023  Glenis Ruggiero is a 52 y.o., female.      Pre-op Assessment    I have reviewed the Patient Summary Reports.     I have reviewed the Nursing Notes.       Review of Systems  Anesthesia Hx:  No problems with previous Anesthesia    Hepatic/GI:   PUD,    Neurological:   Neuromuscular Disease,    Psych:   Psychiatric History          Physical Exam  General: Well nourished        Anesthesia Plan  Type of Anesthesia, risks & benefits discussed:    Anesthesia Type: Gen ETT  Intra-op Monitoring Plan: Standard ASA Monitors  Post Op Pain Control Plan: multimodal analgesia and IV/PO Opioids PRN  Induction:  IV  Airway Plan: Video  Informed Consent: Informed consent signed with the Patient and all parties understand the risks and agree with anesthesia plan.  All questions answered.   ASA Score: 2  Day of Surgery Review of History & Physical: H&P Update referred to the surgeon/provider.    Ready For Surgery From Anesthesia Perspective.     .

## 2023-02-07 NOTE — HPI
Glenis Rugigero is a 52 year old female with a past medical history of pelvic organ prolapse, DWIGHT, ADD, UC, and fibromyalgia who presented for elective robotic hysterectomy and sacrocolpopexy per Dr. Briseno. The patient tolerated the procedure well and there were no intraoperative complications. She is unable to provide history at this time. She will be monitored overnight postoperatively.

## 2023-02-07 NOTE — TRANSFER OF CARE
Anesthesia Transfer of Care Note    Patient: Glenis Ruggiero    Procedure(s) Performed: Procedure(s) (LRB):  XI ROBOTIC SACROCOLPOPEXY, ABDOMINAL (N/A)  XI ROBOTIC HYSTERECTOMY,WITH SALPINGO-OOPHORECTOMY (Bilateral)  CYSTOSCOPY (N/A)    Patient location: PACU    Anesthesia Type: general    Transport from OR: Transported from OR on 2-3 L/min O2 by NC with adequate spontaneous ventilation    Post pain: adequate analgesia    Post assessment: no apparent anesthetic complications and tolerated procedure well    Post vital signs: stable    Level of consciousness: sedated    Nausea/Vomiting: no nausea/vomiting    Complications: none    Transfer of care protocol was followed      Last vitals:   Visit Vitals  /60 (BP Location: Right arm, Patient Position: Sitting)   Pulse 71   Temp 36.6 °C (97.9 °F) (Temporal)   Resp 16   Wt 55.8 kg (123 lb)   LMP 08/18/2014   SpO2 98%   Breastfeeding No   BMI 19.26 kg/m²

## 2023-02-07 NOTE — SUBJECTIVE & OBJECTIVE
Past Medical History:   Diagnosis Date    ADD (attention deficit disorder)     Anxiety     Depression     Fibromyalgia     Prediabetes     Scoliosis     Ulcerative colitis        Past Surgical History:   Procedure Laterality Date    AUGMENTATION OF BREAST      BREAST SURGERY      implants and lift    NASAL SEPTUM SURGERY      TONSILLECTOMY         Review of patient's allergies indicates:  No Known Allergies    No current facility-administered medications on file prior to encounter.     Current Outpatient Medications on File Prior to Encounter   Medication Sig    adalimumab (HUMIRA,CF,) 40 mg/0.4 mL SyKt Humira(CF) 40 mg/0.4 mL subcutaneous syringe kit    clonazePAM (KLONOPIN) 0.5 MG tablet clonazepam 0.5 mg tablet   TAKE 1 TABLET BY MOUTH EVERY DAY AS NEEDED    fluticasone (FLONASE) 50 mcg/actuation nasal spray 1 spray by Each Nare route once daily.    naratriptan (AMERGE) 2.5 MG tablet as needed.    progesterone (PROMETRIUM) 100 MG capsule every evening.    RESTASIS 0.05 % ophthalmic emulsion     tramadol (ULTRAM) 50 mg tablet Take 50 mg by mouth 2 (two) times daily as needed.    estradioL (EVAMIST) 1.53 mg/spray (1.7%) transdermal spray Evamist 1.53 mg/spray (1.7 %) transdermal spray   APPLY ONE SPRAY EVERY DAY BY TOPICAL ROUTE    insulin detemir U-100 (LEVEMIR FLEXTOUCH U-100 INSULN) 100 unit/mL (3 mL) InPn pen Levemir FlexTouch U-100 Insulin 100 unit/mL (3 mL) subcutaneous pen    lisinopriL 10 MG tablet prn    mesalamine (APRISO) 0.375 gram Cp24 Take 3 capsules by mouth once daily.    minocycline (MINOCIN,DYNACIN) 50 MG Cap Take 1 capsule by mouth 2 (two) times a day. prn    mupirocin (BACTROBAN) 2 % ointment 3 (three) times daily. Apply to affected area    ondansetron (ZOFRAN) 4 MG tablet ondansetron HCl 4 mg tablet   TAKE 1 TABLET BY MOUTH EVERY DAY AS NEEDED    testosterone (ANDROGEL) 1 % (50 mg/5 gram) GlPk testosterone 50 mg/5 gram (1 %) transdermal gel   APPLY EXTERNALLY TO SPECIFIC AREA OF SKIN     triamcinolone acetonide 0.1% (KENALOG) 0.1 % paste      Family History       Problem Relation (Age of Onset)    Breast cancer Maternal Aunt    Cancer Maternal Aunt (45)    Thyroid disease Maternal Aunt    Ephraim syndrome Son          Tobacco Use    Smoking status: Some Days     Types: Cigarettes, Vaping with nicotine    Smokeless tobacco: Never    Tobacco comments:     24 year old son with Jamaal's disease which causes mental retardation; she is ; 3 children total   Substance and Sexual Activity    Alcohol use: Yes     Comment: Couple drinks every 2 weeks    Drug use: No    Sexual activity: Yes     Partners: Male     Review of Systems   Unable to perform ROS: Mental status change   Objective:     Vital Signs (Most Recent):  Temp: 98.1 °F (36.7 °C) (02/07/23 1445)  Pulse: (!) 56 (02/07/23 1545)  Resp: 16 (02/07/23 1545)  BP: 127/65 (02/07/23 1545)  SpO2: 98 % (02/07/23 1545)   Vital Signs (24h Range):  Temp:  [97.3 °F (36.3 °C)-98.1 °F (36.7 °C)] 98.1 °F (36.7 °C)  Pulse:  [52-71] 56  Resp:  [10-25] 16  SpO2:  [89 %-100 %] 98 %  BP: (114-173)/(53-79) 127/65     Weight: 55.8 kg (123 lb)  Body mass index is 19.26 kg/m².    Physical Exam  Vitals and nursing note reviewed.   Constitutional:       General: She is not in acute distress.     Appearance: She is normal weight.   HENT:      Head: Normocephalic and atraumatic.      Right Ear: External ear normal.      Left Ear: External ear normal.      Nose: Nose normal.      Mouth/Throat:      Mouth: Mucous membranes are moist.      Pharynx: Oropharynx is clear.   Eyes:      Extraocular Movements: Extraocular movements intact.      Conjunctiva/sclera: Conjunctivae normal.   Cardiovascular:      Rate and Rhythm: Normal rate and regular rhythm.      Pulses: Normal pulses.      Heart sounds: Normal heart sounds.   Pulmonary:      Effort: Pulmonary effort is normal.      Breath sounds: Normal breath sounds.   Abdominal:      General: Bowel sounds are normal. There is no  distension.      Palpations: Abdomen is soft.      Tenderness: There is no abdominal tenderness.   Genitourinary:     Comments: Kang.  Musculoskeletal:      Cervical back: Normal range of motion and neck supple.      Right lower leg: No edema.      Left lower leg: No edema.   Skin:     General: Skin is dry.   Neurological:      Mental Status: She is lethargic and disoriented.           Significant Labs: All pertinent labs within the past 24 hours have been reviewed.    Significant Imaging: I have reviewed all pertinent imaging results/findings within the past 24 hours.

## 2023-02-07 NOTE — ASSESSMENT & PLAN NOTE
Robotic. With sacrocolpopexy per Dr. Briseno.  -Monitor overnight  -Kang  -PRN antiemetics and analgesics  -Incentive spirometry

## 2023-02-07 NOTE — OP NOTE
Operative Note       Surgery Date: 2/7/2023     Surgeon(s) and Role:     * Jeff Briseno MD - Primary    Pre-op Diagnosis:  Cystocele and rectocele with incomplete uterovaginal prolapse [N81.2]    Post-op Diagnosis: Post-Op Diagnosis Codes:     * Cystocele and rectocele with incomplete uterovaginal prolapse [N81.2]         Assistant:  Julia PHILIPPEA:  My assistant was actively engaged throughout every aspect of surgery from safe entry to exposure to suction irrigation bringing suture material extracting utilize needles and as well as assisting with every facet she was actively engaged throughout every serve element of the surgery     Pre-op Diagnosis:  Cystocele and rectocele with incomplete uterovaginal prolapse [N81.2]     Post-op Diagnosis: Post-Op Diagnosis Codes:     * Cystocele and rectocele with incomplete uterovaginal prolapse [N81.2]     Procedure(s) (LRB):  XI ROBOTIC HYSTERECTOMY, SUPRACERVICAL (N/A)  XI ROBOTIC SACROCOLPOPEXY, ABDOMINAL (N/A)  CYSTOSCOPY (N/A)     Anesthesia: General     Procedure in Detail/Findings:  The patient was identified in the preoperative area where informed consent was confirmed, and she was taken to the operating room where an adequate level of general anesthesia was obtained. The patient was positioned in high lithotomy position with legs in yellow fin stirrups. Care was taken to avoid joint hyperflexion or hyperextension, and all extremity surfaces were carefully padded so as to minimize risk of neurologic injury. Intravenous antibiotics were administered preoperatively. Sequential compression devices were applied to the patient's lower extremities preoperatively VTE prophylaxis. Surgical time-out was performed, where the patient was identified and procedures confirmed. An examination under anesthesia was performed with findings described as above. The patient's abdomen, perineum, and vagina were sterilely prepped and draped. A subramanian catheter was placed in the bladder  for drainage.   Exam under anesthesia revealed stage 2 apical prolapse and stage 3 anterior vaginal wall prolapse. Attention was then turned to the abdomen. A 10-mm incision suprafraumbilical incision was made with a scalpel. A 5 mm laparoscoped 0 degree was placed into a 5 mm trocar under direct visualization the abdomen was entered. Insufflation was activated. Appropriate intraperitoneal pressure returned. We insufflated to 15 mm of mercury. Reinserted the laparoscoped safe entry confirmed.   The camera was inserted through this port for visualization of all subsequent port placement. Two 8 mm ports were placed on either side of the supraumbilical port, each approximately 8 cm lateral, along the mid clavicular line. Each 8 mm trocar was inserted under direct visualization without significant trauma, at least 2 cm cephalad and medial to the anterior superior iliac spine. A third 8 mm port was placed in the left upper quadrant, 2 cm below the costal margin in his lateral as possible. A 12 mm assistant port was placed in the righ upper quadrant, cephalad to and midway between the umbilical and 8 mm right lower quadrant port in a triangulated fashion. There were no complications with these port placements. At this point, a total of 5 ports ( 4 robotic) had been placed, including the umbilical port for the camera. The robot was then docked.  Additional lysis of omental adhesions was performed until the omentum was completely freed from the anterior abdominal wall. Filmy adhesions were noted along the posterior cul-de sac and sigmoid colon. These were easily lysed.   In the lower right quadrant that port is designated as arm 1. And we placed a Portia bipolar through that under direct visualization  In the lower left quadrant that port is designated as arm number 3 arm 2. Will be holding the camera through the super umbilical port. In the upper left quadrant that will be designated as the 4th port for the 4th robotic  arm.  Through the 3rd robotic port a vessel sealer extend is placed under direct visualization arm, the 4th robotic port Has a robotic tenaculum placed under direct visualization.  The tenaculum is then used to grab the fundus of the uterus pole towards the upper left until the round ligament on the right as well as the right infundibulopelvic ligament are under slight tension. Through meticulous dissection the infundibulopelvic ligament on the right is isolated the vessel sealer extend is then utilized in a perpendicular fashion close to the ovary x2 well away from the pelvic sidewall the then utilized to cut ,excellent hemostasis the vessel sealer extend is then utilized in a parallel manner to the long axis of the fallopian tube to approach the round ligament a vessel sealer extend is deployed in a perpendicular manner to the round ligament in the retroperitoneum is entered further isolation and separation of the anterior and posterior leaflets of the broad ligament are conducted the uterine artery is further skeletonized the vessel sealer extend is utilized come in perpendicular deployed in 2 separate areas close to the isthmus of the uterus. And transected.     The vessel sealer extend and Portia bipolar positions are switched for want another     We then deviate the tenaculum to the upper right thus placing the left round ligament than left infundibulopelvic ligament under slight tension and the same sequence of steps are undertaken where the left infundibulopelvic ligament is meticulously isolated and transected and the retroperitoneum is entered. The left uterine artery is then isolated and this make region and then the vessel sealer extend is utilized in a parallel manner very close to this and deployed and then transected     [place] bipolar is then put in arm 3.  And monopolar scissor in arm 1.  .  The tenaculum was then anteverted showing me the posterior aspect of the uterus and cul-de-sac I am easily  able to delineate the outline of the cervix my points of transection of the uterine artery line up completely with the S mixed junction I am able to create a line horizontally at this level on the posterior aspect I am then able to further isolate peritoneum going up to the area of transection on the round ligament on the left as well as the right. I am then able to retro overt the uterus isolate the peritoneum anteriorly. From the right transection on the round ligament I am able to then sent up the loose peritoneum anteriorly towards the cervix anteriorly I am able to repeat that from the left point on the round ligament and take that down in an arc in fashion similar to what I started on the right as I am tenting up the loose peritoneum this is the apical aspect of the peritoneum that will allow me to enter into the vesico vaginal space I am then able to tent up through the help of my assistant in achieve entry into this avascular plane once this is started anteriorly and I have gone distally I am then able to Antivert the uterus and utilized my monopolar scissor to transect the uterus off of the cervix at the this make junction and then place the uterus along with the fallopian tubes and ovaries into a bag brought in through the assistant port. An this bags then placed in the upper left quadrant.  I am then able to utilize the robotic tenaculum grab on to the cervix in an anterior to posterior orientation I then anteverted I am able to then delineate the loose peritoneum in the vicinity of the rectal reflection I am able to simply incise peritoneum I have my assistant tent that cephalad and downward while I then enter into the avascular rectovaginal space and developed that plane all the way down to the perineal body.  I then retro overt the tenaculum have my assistant tent up on the apical aspect of the peritoneum from my entry point into the vesicovaginal space and I then further developed this plane all the  way distal to the bladder neck as delineated by the outline of the Kang bulb.  Attention was then turned to the sacral promontory, which was identified by visualization and palpation. Again, the course of the right ureter as well as the location of the sigmoid colon was identified. The peritoneum overlying the sacral promontory was then elevated and incised using the robotic scissors. Gentle blunt dissection was then undertaken to reveal presacral fascia again with hemostasis noted. A Channel was created along the right aspect of the pelvic sidewall making sure to remain medial to the right ureter and lateral to the sigmoid.   The robotic scissors in arm 1. The Portia bipolar in arm 3. These were changed under direct visualization for needle drivers with needle  in arm 1. Being a suture cut model.  Mesh that had been prepared the start of the case by cutting it down to the needed dimensions was then introduced into the abdomen.  Then taking the distal end of posterior flap we were then able to utilize Cortex suture on THX 26 needle suturing from a distal to apical manner where the crotch of the Y graft bisecting the horizontal 3:00 to 9:00 position of the cervical stump. There was about a 1 cm distance between substance of the graft and tissue. The anterior flap was then brought over and from an apical to distal manner this was sutured utilizing the same type of suture in an interrupted manner.  Multiple sutures utilized posteriorly as well as anteriorly to provide for excellent approximation of graft to tissue.     It is 3 interrupted sutures distal as well as 3 proximal on the cervical stump the posterior compartment same configuration for the anterior compartment.  Both compartments are supplemented with separate 3 0 V lock 90 sutures to further supplement graft to pubis cervical rectovaginal tissue.     Under direct visualization the robotic tenaculum was removed from the patient and replaced by a  Will espinoza.  We were then able to bring in the 0 Monocryl on a CT 1 needle this is 10 in long with a  tie at the end. Starting from the posterior left apical aspect of the peritoneum edge that was developed upon entry into the rectovaginal space the sutures brought through and then carried around in a clockwise manner circumferentially around until the right apical aspect of the peritoneum that was developed upon entry into the rectovaginal space is reached we are then able to come over the tail and of graft and start to cinch down. This does the excellent job of reapproximating the peritoneum in covering our graft. The needle is then parked in a safe area well away from the operative field under direct visualization.  This is utilized to sweep the bowel away and fully expose the anterior longitudinal ligament in the presacral space. A vaginal stent send utilized to produce cephalad displacement of the vagina and cervix. Then through the utilization of tactile and visual cues appropriate tension is determine and 0 Ethibond suture on appropriate needle brought through anterior longitudinal ligament and then slipped not used to secure in place followed by multiple suture thorough. This was done to add additional times to secure.  The Monocryl suture needle was then retrieved under direct visualization and re-approximation of the peritoneum is then continued cephalad until the channel and pre sacral space has the peritoneum which was opened to facilitate delivery of the graft reapproximated.  Cystourethrsocopy: normal bladder mucosa, small area at the base of the bladder with erythema no active bleeding. The bladder mucosa without stones or diverticulum. Bilateral ureteral jets were noted. Normal urethra. The Kang catheter was reinserted.   Attention was again turned to the abdomen, close inspection of the pelvis revealed excellent hemostasis. All instruments were then removed from the abdomen and the  pelvis. A Chava gayle device was deployed to close the 12 mm air seal port an endoscopic fashion in the appropriate manner. The bag containing the the specimen was then brought up through the supraumbilical port all of the trocar were then removed under direct visualization.  The skin incision and fascial incision for that supraumbilical port was then expanded and the specimen removed the fascial edges were identified grasped and 0 Vicryl independent sutures were then utilized to close the fascia. The remainder of the skin incisions were reapproximated with 4.0 Monocryl, Steri-strips, 2x2s, and Tegaderm.The patient tolerated the procedure well. Needle, sponge lap, and instrument counts were correct x2.               The patient was transferred to recovery in stable condition. The vagina was packed with guaze.     Estimated Blood Loss: * No values recorded between 2/7/2023 10:40 AM and 2/7/2023 12:48 PM *           Specimens (From admission, onward)       Start     Ordered    02/07/23 1229  Specimen to Pathology, Surgery Gynecology and Obstetrics  Once        Comments: Pre-op Diagnosis: Cystocele and rectocele with incomplete uterovaginal prolapse [N81.2]Procedure(s):XI ROBOTIC SACROCOLPOPEXY, ABDOMINALXI ROBOTIC HYSTERECTOMY,WITH SALPINGO-OOPHORECTOMYCYSTOSCOPY Number of specimens: 1Name of specimens: UTERUS AND BILATERAL TUBES AND OVARIES     References:    Click here for ordering Quick Tip   Question Answer Comment   Procedure Type: Gynecology and Obstetrics    Specimen Class: Routine/Screening    Which provider would you like to cc? BREONNA GAMBLE    Release to patient Immediate        02/07/23 1228                  Implants:   Implant Name Type Inv. Item Serial No.  Lot No. LRB No. Used Action   MESH RESTORELLE Y 24X4CM - [id number]  [employer] Y 24X4CM  [id number] N/A 1 Implanted              Disposition: PACU - hemodynamically stable.           Condition: Good    Attestation:  I was  present and scrubbed for the entire procedure.           Discharge Note    Admit Date: 2/7/2023    Attending Physician: Jeff Briseno MD     Discharge Physician: Jeff Briseno MD    Final Diagnosis: Post-Op Diagnosis Codes:     * Cystocele and rectocele with incomplete uterovaginal prolapse [N81.2]    Disposition: Home or Self Care    Patient Instructions:   Current Discharge Medication List        CONTINUE these medications which have NOT CHANGED    Details   adalimumab (HUMIRA,CF,) 40 mg/0.4 mL SyKt Humira(CF) 40 mg/0.4 mL subcutaneous syringe kit      ADDERALL XR 30 mg 24 hr capsule Take by mouth every morning.      budesonide (ENTOCORT EC) 3 mg capsule TAKE 3 CAPSULES BY MOUTH ONCE A DAY      clonazePAM (KLONOPIN) 0.5 MG tablet clonazepam 0.5 mg tablet   TAKE 1 TABLET BY MOUTH EVERY DAY AS NEEDED      fluticasone (FLONASE) 50 mcg/actuation nasal spray 1 spray by Each Nare route once daily.  Qty: 1 Bottle, Refills: 0    Associated Diagnoses: Acute bacterial rhinosinusitis      naratriptan (AMERGE) 2.5 MG tablet as needed.      progesterone (PROMETRIUM) 100 MG capsule every evening.      RESTASIS 0.05 % ophthalmic emulsion       tramadol (ULTRAM) 50 mg tablet Take 50 mg by mouth 2 (two) times daily as needed.  Refills: 3      TRINTELLIX 10 mg Tab Take 1 tablet by mouth.      estradioL (EVAMIST) 1.53 mg/spray (1.7%) transdermal spray Evamist 1.53 mg/spray (1.7 %) transdermal spray   APPLY ONE SPRAY EVERY DAY BY TOPICAL ROUTE      insulin detemir U-100 (LEVEMIR FLEXTOUCH U-100 INSULN) 100 unit/mL (3 mL) InPn pen Levemir FlexTouch U-100 Insulin 100 unit/mL (3 mL) subcutaneous pen      lisinopriL 10 MG tablet prn      mesalamine (APRISO) 0.375 gram Cp24 Take 3 capsules by mouth once daily.      minocycline (MINOCIN,DYNACIN) 50 MG Cap Take 1 capsule by mouth 2 (two) times a day. prn      mupirocin (BACTROBAN) 2 % ointment 3 (three) times daily. Apply to affected area      ondansetron (ZOFRAN) 4 MG tablet  ondansetron HCl 4 mg tablet   TAKE 1 TABLET BY MOUTH EVERY DAY AS NEEDED      testosterone (ANDROGEL) 1 % (50 mg/5 gram) GlPk testosterone 50 mg/5 gram (1 %) transdermal gel   APPLY EXTERNALLY TO SPECIFIC AREA OF SKIN      triamcinolone acetonide 0.1% (KENALOG) 0.1 % paste Refills: 2             Discharge Procedure Orders (must include Diet, Follow-up, Activity)  No discharge procedures on file.     Discharge Date: No discharge date for patient encounter.

## 2023-02-07 NOTE — PLAN OF CARE
Calm restful will awake states pain then falls deeply asleep explained to  pt urge to void is normal and that she has a catheter and has vaginal pkg

## 2023-02-07 NOTE — PLAN OF CARE
VSS, all questions answered. Denies recent fever or illness. Pt. Extremely anxious about surgery. Pt states ready for procedure.

## 2023-02-07 NOTE — CARE UPDATE
02/07/23 1445   PRE-TX-O2   SpO2 (!) 94 %   Pulse 64   Resp 13   Temp 98.1 °F (36.7 °C)   /66   Incentive Spirometer   $ Incentive Spirometer Charges postop instruction   Incentive Spirometer Predicted Level (mL) 2040   Administration (IS) instruction provided, follow-up   Number of Repetitions (IS) 5   Level Incentive Spirometer (mL) 1000   Patient Tolerance (IS) poor  (pt sleepy)

## 2023-02-08 VITALS
HEART RATE: 71 BPM | WEIGHT: 122.81 LBS | OXYGEN SATURATION: 100 % | DIASTOLIC BLOOD PRESSURE: 60 MMHG | BODY MASS INDEX: 19.28 KG/M2 | RESPIRATION RATE: 17 BRPM | HEIGHT: 67 IN | SYSTOLIC BLOOD PRESSURE: 105 MMHG | TEMPERATURE: 98 F

## 2023-02-08 LAB
BASOPHILS # BLD AUTO: 0.02 K/UL (ref 0–0.2)
BASOPHILS NFR BLD: 0.1 % (ref 0–1.9)
DIFFERENTIAL METHOD: ABNORMAL
EOSINOPHIL # BLD AUTO: 0 K/UL (ref 0–0.5)
EOSINOPHIL NFR BLD: 0.1 % (ref 0–8)
ERYTHROCYTE [DISTWIDTH] IN BLOOD BY AUTOMATED COUNT: 12.7 % (ref 11.5–14.5)
HCT VFR BLD AUTO: 36.3 % (ref 37–48.5)
HGB BLD-MCNC: 12.2 G/DL (ref 12–16)
IMM GRANULOCYTES # BLD AUTO: 0.07 K/UL (ref 0–0.04)
IMM GRANULOCYTES NFR BLD AUTO: 0.4 % (ref 0–0.5)
LYMPHOCYTES # BLD AUTO: 2.8 K/UL (ref 1–4.8)
LYMPHOCYTES NFR BLD: 17 % (ref 18–48)
MCH RBC QN AUTO: 32.6 PG (ref 27–31)
MCHC RBC AUTO-ENTMCNC: 33.6 G/DL (ref 32–36)
MCV RBC AUTO: 97 FL (ref 82–98)
MONOCYTES # BLD AUTO: 1 K/UL (ref 0.3–1)
MONOCYTES NFR BLD: 6.3 % (ref 4–15)
NEUTROPHILS # BLD AUTO: 12.5 K/UL (ref 1.8–7.7)
NEUTROPHILS NFR BLD: 76.1 % (ref 38–73)
NRBC BLD-RTO: 0 /100 WBC
PLATELET # BLD AUTO: 263 K/UL (ref 150–450)
PMV BLD AUTO: 9.6 FL (ref 9.2–12.9)
RBC # BLD AUTO: 3.74 M/UL (ref 4–5.4)
WBC # BLD AUTO: 16.44 K/UL (ref 3.9–12.7)

## 2023-02-08 PROCEDURE — 36415 COLL VENOUS BLD VENIPUNCTURE: CPT | Performed by: OBSTETRICS & GYNECOLOGY

## 2023-02-08 PROCEDURE — 94799 UNLISTED PULMONARY SVC/PX: CPT

## 2023-02-08 PROCEDURE — 85025 COMPLETE CBC W/AUTO DIFF WBC: CPT | Performed by: OBSTETRICS & GYNECOLOGY

## 2023-02-08 PROCEDURE — 25000003 PHARM REV CODE 250: Performed by: OBSTETRICS & GYNECOLOGY

## 2023-02-08 PROCEDURE — 94761 N-INVAS EAR/PLS OXIMETRY MLT: CPT

## 2023-02-08 PROCEDURE — 63600175 PHARM REV CODE 636 W HCPCS: Performed by: OBSTETRICS & GYNECOLOGY

## 2023-02-08 RX ORDER — OXYCODONE AND ACETAMINOPHEN 7.5; 325 MG/1; MG/1
1 TABLET ORAL EVERY 8 HOURS PRN
Qty: 21 TABLET | Refills: 0 | Status: SHIPPED | OUTPATIENT
Start: 2023-02-08 | End: 2023-02-15

## 2023-02-08 RX ORDER — ESTRADIOL 0.1 MG/G
1 CREAM VAGINAL
Qty: 42 G | Refills: 4 | Status: ON HOLD | OUTPATIENT
Start: 2023-02-08 | End: 2023-07-20 | Stop reason: CLARIF

## 2023-02-08 RX ADMIN — KETOROLAC TROMETHAMINE 30 MG: 30 INJECTION, SOLUTION INTRAMUSCULAR; INTRAVENOUS at 05:02

## 2023-02-08 RX ADMIN — MUPIROCIN 1 G: 20 OINTMENT TOPICAL at 09:02

## 2023-02-08 RX ADMIN — OXYCODONE AND ACETAMINOPHEN 1 TABLET: 10; 325 TABLET ORAL at 11:02

## 2023-02-08 RX ADMIN — OXYCODONE AND ACETAMINOPHEN 1 TABLET: 10; 325 TABLET ORAL at 06:02

## 2023-02-08 RX ADMIN — POLYETHYLENE GLYCOL 3350 17 G: 17 POWDER, FOR SOLUTION ORAL at 09:02

## 2023-02-08 RX ADMIN — OXYCODONE AND ACETAMINOPHEN 1 TABLET: 10; 325 TABLET ORAL at 12:02

## 2023-02-08 NOTE — PLAN OF CARE
Pt cleared for DC from case management standpoint. Discharging home.     DC after assessed by Dr. Flores     02/08/23 1059   Final Note   Assessment Type Final Discharge Note   Anticipated Discharge Disposition Home   Hospital Resources/Appts/Education Provided Appointments scheduled and added to AVS

## 2023-02-08 NOTE — ASSESSMENT & PLAN NOTE
Robotic. With sacrocolpopexy per Dr. Briseno.  -Monitored overnight  -Kang removed  -PRN antiemetics and analgesics  -Incentive spirometry

## 2023-02-08 NOTE — SUBJECTIVE & OBJECTIVE
"Interval History: see "Hospital Course"    Review of Systems   Gastrointestinal:  Positive for abdominal pain.   All other systems reviewed and are negative.  Objective:     Vital Signs (Most Recent):  Temp: 97.9 °F (36.6 °C) (02/08/23 0712)  Pulse: 71 (02/08/23 0751)  Resp: 18 (02/08/23 0751)  BP: 105/60 (02/08/23 0712)  SpO2: 100 % (02/08/23 0751) Vital Signs (24h Range):  Temp:  [97.3 °F (36.3 °C)-98.2 °F (36.8 °C)] 97.9 °F (36.6 °C)  Pulse:  [52-82] 71  Resp:  [10-25] 18  SpO2:  [89 %-100 %] 100 %  BP: (104-173)/(53-79) 105/60     Weight: 55.7 kg (122 lb 12.7 oz)  Body mass index is 19.23 kg/m².    Intake/Output Summary (Last 24 hours) at 2/8/2023 1050  Last data filed at 2/8/2023 0958  Gross per 24 hour   Intake 4735.23 ml   Output 3325 ml   Net 1410.23 ml      Physical Exam  Vitals and nursing note reviewed.   Constitutional:       General: She is not in acute distress.     Appearance: She is normal weight.   HENT:      Head: Normocephalic and atraumatic.      Right Ear: External ear normal.      Left Ear: External ear normal.      Nose: Nose normal.      Mouth/Throat:      Mouth: Mucous membranes are moist.      Pharynx: Oropharynx is clear.   Eyes:      Extraocular Movements: Extraocular movements intact.      Conjunctiva/sclera: Conjunctivae normal.   Cardiovascular:      Rate and Rhythm: Normal rate and regular rhythm.      Pulses: Normal pulses.      Heart sounds: Normal heart sounds.   Pulmonary:      Effort: Pulmonary effort is normal.      Breath sounds: Normal breath sounds.   Abdominal:      General: Bowel sounds are normal. There is no distension.      Palpations: Abdomen is soft.      Tenderness: There is no abdominal tenderness.   Musculoskeletal:      Cervical back: Normal range of motion and neck supple.      Right lower leg: No edema.      Left lower leg: No edema.   Skin:     General: Skin is dry.       Significant Labs: All pertinent labs within the past 24 hours have been " reviewed.    Significant Imaging: I have reviewed all pertinent imaging results/findings within the past 24 hours.

## 2023-02-08 NOTE — HOSPITAL COURSE
Glenis Ruggiero is a 52 year old female with a past medical history of pelvic organ prolapse, DWIGHT, ADD, UC, and fibromyalgia who presented for elective robotic hysterectomy and sacrocolpopexy per Dr. Briseno. The patient tolerated the procedure well and there were no perioperative complications. She was monitored overnight and she underwent a successful voiding trial after Kang removal. She was discharged 2/8/2023 and will follow up with Gynecology in the outpatient setting.

## 2023-02-08 NOTE — DISCHARGE SUMMARY
[facility]  Urology  Discharge Summary      Patient Name: Glenis Ruggiero  MRN: [id number]  Admission Date: 2/7/2023  Hospital Length of Stay: 0 days  Discharge Date and Time:  02/08/2023 11:44 AM  Attending Physician: Breonna Briseno MD   Discharging Provider: Breonna Briseno MD  Primary Care Physician: Snehal Grullon NP    HPI:   No notes on file    Procedure(s) (LRB):  XI ROBOTIC SACROCOLPOPEXY, ABDOMINAL (N/A)  XI ROBOTIC HYSTERECTOMY,WITH SALPINGO-OOPHORECTOMY (Bilateral)  CYSTOSCOPY (N/A)     Indwelling Lines/Drains at time of discharge:   Lines/Drains/Airways     None                 Hospital Course (synopsis of major diagnoses, care, treatment, and services provided during the course of the hospital stay):  Uncomplicated patient tolerated surgery met criteria    Goals of Care Treatment Preferences:         Consults:   Consults (From admission, onward)        Status Ordering Provider     Inpatient consult to Hospitalist  Once        Provider:  (Not yet assigned)    BREONNA Zeng          Significant Diagnostic Studies: uncomplicated    Pending Diagnostic Studies:     Procedure Component Value Units Date/Time    Specimen to Pathology, Surgery Gynecology and Obstetrics [[id number]] Collected: 02/07/23 1229    Order Status: Sent Lab Status: In process Updated: 02/07/23 1317    Specimen: Tissue           Final Active Diagnoses:    Diagnosis Date Noted POA    PRINCIPAL PROBLEM:  S/P hysterectomy [Z90.710] 02/07/2023 Yes    Anxiety [F41.9] 02/07/2023 Yes    Ulcerative colitis [K51.90] 10/27/2021 Yes    ADD (attention deficit disorder) [F98.8] 06/27/2013 Yes      Problems Resolved During this Admission:         Discharged Condition: good    Disposition: Home or Self Care    Follow Up:   Follow-up Information     MARÍA Tariq. Go on 2/23/2023.    Specialty: UroGynecology   Why: post-op follow up appointment on 2/23/2023 at 11:00    this appt is with the NP  Contact  information:  [address] [place] [address] [address]  [address]  [place] [address]  [phone]             Jeff Briseno MD. Go on 3/22/2023.    Specialties: Gynecology, Urology  Why: post-op appointment on 3/22/2023 at 9:30 AM  Contact information:  [address] [place] [address] DRIVE  [address]  [place] [address]  [phone]                       Patient Instructions:      Diet Cardiac     Pelvic Rest     Lifting restrictions     Notify your health care provider if you experience any of the following:  temperature >100.4     Notify your health care provider if you experience any of the following:     Notify your health care provider if you experience any of the following:  increased confusion or weakness     Notify your health care provider if you experience any of the following:  persistent dizziness, light-headedness, or visual disturbances     Notify your health care provider if you experience any of the following:  worsening rash     Notify your health care provider if you experience any of the following:  severe persistent headache     Notify your health care provider if you experience any of the following:  difficulty breathing or increased cough     Notify your health care provider if you experience any of the following:  redness, tenderness, or signs of infection (pain, swelling, redness, odor or green/yellow discharge around incision site)     Notify your health care provider if you experience any of the following:  severe uncontrolled pain     Notify your health care provider if you experience any of the following:  persistent nausea and vomiting or diarrhea     Medications:  Reconciled Home Medications:      Medication List      START taking these medications    oxyCODONE-acetaminophen 7.5-325 mg per tablet  Commonly known as: PERCOCET  Take 1 tablet by mouth every 8 (eight) hours as needed for Pain.        CHANGE how you take these medications    * estradioL 0.01 % (0.1 mg/gram) vaginal cream  Commonly known as:  ESTRACE  Place 1 g vaginally every Mon, Wed, Fri.  What changed: You were already taking a medication with the same name, and this prescription was added. Make sure you understand how and when to take each.     * EVAMIST 1.53 mg/spray (1.7%) transdermal spray  Generic drug: estradioL  Evamist 1.53 mg/spray (1.7 %) transdermal spray   APPLY ONE SPRAY EVERY DAY BY TOPICAL ROUTE  What changed: Another medication with the same name was added. Make sure you understand how and when to take each.         * This list has 2 medication(s) that are the same as other medications prescribed for you. Read the directions carefully, and ask your doctor or other care provider to review them with you.            CONTINUE taking these medications    ADDERALL XR 30 MG 24 hr capsule  Generic drug: dextroamphetamine-amphetamine  Take by mouth every morning.     budesonide 3 mg capsule  Commonly known as: ENTOCORT EC  TAKE 3 CAPSULES BY MOUTH ONCE A DAY     clonazePAM 0.5 MG tablet  Commonly known as: KlonoPIN  clonazepam 0.5 mg tablet   TAKE 1 TABLET BY MOUTH EVERY DAY AS NEEDED     fluticasone propionate 50 mcg/actuation nasal spray  Commonly known as: FLONASE  1 spray by Each Nare route once daily.     HUMIRA(CF) 40 mg/0.4 mL Sykt  Generic drug: adalimumab  Humira(CF) 40 mg/0.4 mL subcutaneous syringe kit     LEVEMIR FLEXTOUCH U-100 INSULN 100 unit/mL (3 mL) Inpn pen  Generic drug: insulin detemir U-100  Levemir FlexTouch U-100 Insulin 100 unit/mL (3 mL) subcutaneous pen     lisinopriL 10 MG tablet  prn     minocycline 50 MG Cap  Commonly known as: MINOCIN,DYNACIN  Take 1 capsule by mouth 2 (two) times a day. prn     mupirocin 2 % ointment  Commonly known as: BACTROBAN  3 (three) times daily. Apply to affected area     naratriptan 2.5 MG tablet  Commonly known as: AMERGE  as needed.     ondansetron 4 MG tablet  Commonly known as: ZOFRAN  ondansetron HCl 4 mg tablet   TAKE 1 TABLET BY MOUTH EVERY DAY AS NEEDED     progesterone 100 MG  capsule  Commonly known as: PROMETRIUM  every evening.     RESTASIS 0.05 % ophthalmic emulsion  Generic drug: cycloSPORINE     testosterone 1 % (50 mg/5 gram) Glpk  Commonly known as: ANDROGEL  testosterone 50 mg/5 gram (1 %) transdermal gel   APPLY EXTERNALLY TO SPECIFIC AREA OF SKIN     traMADoL 50 mg tablet  Commonly known as: ULTRAM  Take 50 mg by mouth 2 (two) times daily as needed.     triamcinolone acetonide 0.1% 0.1 % paste  Commonly known as: KENALOG     TRINTELLIX 10 mg Tab  Generic drug: vortioxetine  Take 1 tablet by mouth.            Time spent on the discharge of patient: 20 minutes    Jeff Briseno MD  Urology  [facility]

## 2023-02-08 NOTE — PLAN OF CARE
Plan of care reviewed with pt. Pt verbalized understanding. Patient is alert and oriented x 4, able to make needs known. A-febrile throughout the shift. Meds given per MAR. IVF infusing as ordered. Repositions self independently. Complaints of pain and discomfort, PRN pain medication given, full relief obtained. Kang to gravity in place for postop procedure, no s/s of infection to insertion site. Purposeful hourly/q2hr rounding done during shift to promote patient safety. NAD noted. Safety maintained with side rails up x3, bed wheels locked, bed in lowest positioned, call light in reach. Patient educated to call for assistance with ambulation if needed, verbalized understanding. Pt remains free of falls. No further needs expressed at this time. Will continue to monitor.

## 2023-02-08 NOTE — ASSESSMENT & PLAN NOTE
Patient is POD 1 from XI ROBOTIC HYSTERECTOMY, SUPRACERVICAL (N/A) XI ROBOTIC SACROCOLPOPEXY, ABDOMINAL (N/A)  CYSTOSCOPY (N/A) with Dr. Briseno.  She feels that she is doing okay she does have some abdominal tenderness mostly on the right side as consistent with postop status pain is being managed with p.r.n. medication.  Patient has eaten breakfast and denies any nausea/vomiting. patient has voided 400 mL and had 0 residual on bladder scan.  VSS.  NP reviewed postop limitations/restrictions with patient and .  Both verbalized understanding.  Expected discharge is later today.

## 2023-02-08 NOTE — NURSING
Vaginal packing and Subramanian removed without difficulty, subramanian tip intact. 150ml in subramanian upon removal. Pt educated on need to void with in 6 hours of subramanian removal, pt verbalized understanding.

## 2023-02-08 NOTE — PROGRESS NOTES
[facility]  Obstetrics & Gynecology  Progress Note    Patient Name: Glenis Ruggiero  MRN: [id number]  Admission Date: 2/7/2023  Primary Care Provider: Snehal Grullon NP  Principal Problem: S/P hysterectomy    Subjective:     HPI:  No notes on file    Interval History:  Patient is POD 1 from XI ROBOTIC HYSTERECTOMY, SUPRACERVICAL (N/A) XI ROBOTIC SACROCOLPOPEXY, ABDOMINAL (N/A)  CYSTOSCOPY (N/A) with Dr. Briseno.  She feels that she is doing okay she does have some abdominal tenderness mostly on the right side as consistent with postop status pain is being managed with p.r.n. medication.  Patient has eaten breakfast and denies any nausea/vomiting. patient has voided 400 mL and had 0 residual on bladder scan.  VSS    Scheduled Meds:   artificial tears  2 drop Both Eyes Q4H While awake    clonazePAM  0.5 mg Oral Daily    fluticasone propionate  1 spray Each Nostril Daily    ibuprofen  600 mg Oral Q6H    ketorolac  30 mg Intravenous Q6H    lisinopriL  10 mg Oral Daily    mesalamine  500 mg Oral TID    methylphenidate HCl  5 mg Oral BID WM    mupirocin  1 g Nasal BID    polyethylene glycol  17 g Oral Daily    sumatriptan  50 mg Oral Once     Continuous Infusions:   lactated ringers 125 mL/hr at 02/08/23 0650     PRN Meds:artificial tears, bisacodyL, diphenhydrAMINE, diphenhydrAMINE, HYDROmorphone, lorazepam, ondansetron, oxyCODONE-acetaminophen, oxyCODONE-acetaminophen, prochlorperazine    Review of patient's allergies indicates:  No Known Allergies    Objective:     Vital Signs (Most Recent):  Temp: 97.9 °F (36.6 °C) (02/08/23 0712)  Pulse: 71 (02/08/23 0751)  Resp: 18 (02/08/23 0751)  BP: 105/60 (02/08/23 0712)  SpO2: 100 % (02/08/23 0751) Vital Signs (24h Range):  Temp:  [97.3 °F (36.3 °C)-98.2 °F (36.8 °C)] 97.9 °F (36.6 °C)  Pulse:  [52-82] 71  Resp:  [10-25] 18  SpO2:  [89 %-100 %] 100 %  BP: (104-173)/(53-79) 105/60     Weight: 55.7 kg (122 lb 12.7 oz)  Body mass index is 19.23  kg/m².  Patient's last menstrual period was 08/18/2014.    I&O (Last 24H):    Intake/Output Summary (Last 24 hours) at 2/8/2023 1054  Last data filed at 2/8/2023 0958  Gross per 24 hour   Intake 4735.23 ml   Output 3325 ml   Net 1410.23 ml         Laboratory:  I have personallly reviewed all pertinent lab results from the last 24 hours.      Physical Exam:   Constitutional: She is oriented to person, place, and time. She appears well-developed and well-nourished.    HENT:   Head: Normocephalic and atraumatic.       Pulmonary/Chest: Effort normal. No respiratory distress.        Abdominal: Soft. There is abdominal tenderness in the right upper quadrant.   Mild abdominal pain consistent with postop status.             Musculoskeletal: Moves all extremeties.       Neurological: She is alert and oriented to person, place, and time.    Skin: Skin is warm and dry.   Band-Aids to incision sites C/D/I    Psychiatric: She has a normal mood and affect. Her behavior is normal. Thought content normal.     Review of Systems      Assessment/Plan:     * S/P hysterectomy   Patient is POD 1 from XI ROBOTIC HYSTERECTOMY, SUPRACERVICAL (N/A) XI ROBOTIC SACROCOLPOPEXY, ABDOMINAL (N/A)  CYSTOSCOPY (N/A) with Dr. Briseno.  She feels that she is doing okay she does have some abdominal tenderness mostly on the right side as consistent with postop status pain is being managed with p.r.n. medication.  Patient has eaten breakfast and denies any nausea/vomiting. patient has voided 400 mL and had 0 residual on bladder scan.  VSS.  NP reviewed postop limitations/restrictions with patient and .  Both verbalized understanding.  Expected discharge is later today.        ENDY Lo, MARÍA  Obstetrics & Gynecology  [facility]

## 2023-02-08 NOTE — SUBJECTIVE & OBJECTIVE
Interval History:  Patient is POD 1 from XI ROBOTIC HYSTERECTOMY, SUPRACERVICAL (N/A) XI ROBOTIC SACROCOLPOPEXY, ABDOMINAL (N/A)  CYSTOSCOPY (N/A) with Dr. Briseno.  She feels that she is doing okay she does have some abdominal tenderness mostly on the right side as consistent with postop status pain is being managed with p.r.n. medication.  Patient has eaten breakfast and denies any nausea/vomiting. patient has voided 400 mL and had 0 residual on bladder scan.  VSS    Scheduled Meds:   artificial tears  2 drop Both Eyes Q4H While awake    clonazePAM  0.5 mg Oral Daily    fluticasone propionate  1 spray Each Nostril Daily    ibuprofen  600 mg Oral Q6H    ketorolac  30 mg Intravenous Q6H    lisinopriL  10 mg Oral Daily    mesalamine  500 mg Oral TID    methylphenidate HCl  5 mg Oral BID WM    mupirocin  1 g Nasal BID    polyethylene glycol  17 g Oral Daily    sumatriptan  50 mg Oral Once     Continuous Infusions:   lactated ringers 125 mL/hr at 02/08/23 0650     PRN Meds:artificial tears, bisacodyL, diphenhydrAMINE, diphenhydrAMINE, HYDROmorphone, lorazepam, ondansetron, oxyCODONE-acetaminophen, oxyCODONE-acetaminophen, prochlorperazine    Review of patient's allergies indicates:  No Known Allergies    Objective:     Vital Signs (Most Recent):  Temp: 97.9 °F (36.6 °C) (02/08/23 0712)  Pulse: 71 (02/08/23 0751)  Resp: 18 (02/08/23 0751)  BP: 105/60 (02/08/23 0712)  SpO2: 100 % (02/08/23 0751) Vital Signs (24h Range):  Temp:  [97.3 °F (36.3 °C)-98.2 °F (36.8 °C)] 97.9 °F (36.6 °C)  Pulse:  [52-82] 71  Resp:  [10-25] 18  SpO2:  [89 %-100 %] 100 %  BP: (104-173)/(53-79) 105/60     Weight: 55.7 kg (122 lb 12.7 oz)  Body mass index is 19.23 kg/m².  Patient's last menstrual period was 08/18/2014.    I&O (Last 24H):    Intake/Output Summary (Last 24 hours) at 2/8/2023 1054  Last data filed at 2/8/2023 0958  Gross per 24 hour   Intake 4735.23 ml   Output 3325 ml   Net 1410.23 ml         Laboratory:  I have personallly  reviewed all pertinent lab results from the last 24 hours.      Physical Exam:   Constitutional: She is oriented to person, place, and time. She appears well-developed and well-nourished.    HENT:   Head: Normocephalic and atraumatic.       Pulmonary/Chest: Effort normal. No respiratory distress.        Abdominal: Soft. There is abdominal tenderness in the right upper quadrant.   Mild abdominal pain consistent with postop status.             Musculoskeletal: Moves all extremeties.       Neurological: She is alert and oriented to person, place, and time.    Skin: Skin is warm and dry.   Band-Aids to incision sites C/D/I    Psychiatric: She has a normal mood and affect. Her behavior is normal. Thought content normal.     Review of Systems

## 2023-02-08 NOTE — PROGRESS NOTES
"[facility] Hospital Medicine  Progress Note    Patient Name: Glenis Ruggiero  MRN: [id number]  Patient Class: OP- Outpatient Recovery   Admission Date: 2/7/2023  Length of Stay: 0 days  Attending Physician: Jeff Briseno MD  Primary Care Provider: Snehal Grullon NP        Subjective:     Principal Problem:S/P hysterectomy        HPI:  Glenis Ruggiero is a 52 year old female with a past medical history of pelvic organ prolapse, [place], ADD, UC, and fibromyalgia who presented for elective robotic hysterectomy and sacrocolpopexy per Dr. Briseno. The patient tolerated the procedure well and there were no intraoperative complications. She is unable to provide history at this time. She will be monitored overnight postoperatively.      Overview/Hospital Course:  Glenis Ruggiero is a 52 year old female with a past medical history of pelvic organ prolapse, [place], ADD, UC, and fibromyalgia who presented for elective robotic hysterectomy and sacrocolpopexy per Dr. Briseno. The patient tolerated the procedure well and there were no perioperative complications. She was monitored overnight and she underwent a successful voiding trial after Kang removal. She was discharged 2/8/2023 and will follow up with Gynecology in the outpatient setting.      Interval History: see "Hospital Course"    Review of Systems   Gastrointestinal:  Positive for abdominal pain.   All other systems reviewed and are negative.  Objective:     Vital Signs (Most Recent):  Temp: 97.9 °F (36.6 °C) (02/08/23 0712)  Pulse: 71 (02/08/23 0751)  Resp: 18 (02/08/23 0751)  BP: 105/60 (02/08/23 0712)  SpO2: 100 % (02/08/23 0751) Vital Signs (24h Range):  Temp:  [97.3 °F (36.3 °C)-98.2 °F (36.8 °C)] 97.9 °F (36.6 °C)  Pulse:  [52-82] 71  Resp:  [10-25] 18  SpO2:  [89 %-100 %] 100 %  BP: (104-173)/(53-79) 105/60     Weight: 55.7 kg (122 lb 12.7 oz)  Body mass index is 19.23 kg/m².    Intake/Output Summary (Last 24 hours) at 2/8/2023 1050  Last data filed at " 2/8/2023 0958  Gross per 24 hour   Intake 4735.23 ml   Output 3325 ml   Net 1410.23 ml      Physical Exam  Vitals and nursing note reviewed.   Constitutional:       General: She is not in acute distress.     Appearance: She is normal weight.   HENT:      Head: Normocephalic and atraumatic.      Right Ear: External ear normal.      Left Ear: External ear normal.      Nose: Nose normal.      Mouth/Throat:      Mouth: Mucous membranes are moist.      Pharynx: Oropharynx is clear.   Eyes:      Extraocular Movements: Extraocular movements intact.      Conjunctiva/sclera: Conjunctivae normal.   Cardiovascular:      Rate and Rhythm: Normal rate and regular rhythm.      Pulses: Normal pulses.      Heart sounds: Normal heart sounds.   Pulmonary:      Effort: Pulmonary effort is normal.      Breath sounds: Normal breath sounds.   Abdominal:      General: Bowel sounds are normal. There is no distension.      Palpations: Abdomen is soft.      Tenderness: There is no abdominal tenderness.   Musculoskeletal:      Cervical back: Normal range of motion and neck supple.      Right lower leg: No edema.      Left lower leg: No edema.   Skin:     General: Skin is dry.       Significant Labs: All pertinent labs within the past 24 hours have been reviewed.    Significant Imaging: I have reviewed all pertinent imaging results/findings within the past 24 hours.      Assessment/Plan:      * S/P hysterectomy  Robotic. With sacrocolpopexy per Dr. Briseno.  -Monitored overnight  -Kang removed  -PRN antiemetics and analgesics  -Incentive spirometry      Anxiety  -Continue PRN Klonopin      Ulcerative colitis  -Continue home mesalamine      ADD (attention deficit disorder)  -Continue home Adderall        VTE Risk Mitigation (From admission, onward)    None          Discharge Planning   BRENNAN: 2/8/2023     Code Status: Not on file   Is the patient medically ready for discharge?:     Reason for patient still in hospital (select all that apply):  Consult recommendations  Discharge Plan A: Home                  Scott Flores MD  Department of Hospital Medicine   [facility]

## 2023-02-08 NOTE — PLAN OF CARE
[facility]  Initial Discharge Assessment       Primary Care Provider: Snehal Grullon NP    Admission Diagnosis: Cystocele and rectocele with incomplete uterovaginal prolapse [N81.2]    Admission Date: 2/7/2023  Expected Discharge Date: 2/8/2023    Discharge Barriers Identified: None    Payor: [employer] / Plan: [employer] PPO / Product Type: PPO /     Extended Emergency Contact Information  Primary Emergency Contact: Cooper Ruggiero  Address: [address] [place] [address]           [place] [address] [place]  Mobile Phone: [phone]  Relation: Spouse  Preferred language: English    Discharge Plan A: Home  Discharge Plan B: Home with family      [id number] - [place], [place] - [address]  [address]  [place] [address]  Phone: [phone] Fax: [phone]    SW met with patient at bedside to complete discharge planning assessment.  Patient alert and oriented xs 4.  Patient verified all demographic information on facesheet is correct.  Patient verified PCP is ROXY Grullon.  Patient verified primary health insurance is [employer].  Patient with NO home health or DME.  Patient family will provide transportation upon discharge from facility.  Patient independent with ADLs, live with spouse, drives self.      Initial Assessment (most recent)       Adult Discharge Assessment - 02/08/23 1040          Discharge Assessment    Assessment Type Discharge Planning Assessment     Confirmed/corrected address, phone number and insurance Yes     Confirmed Demographics Correct on Facesheet     Source of Information patient     Does patient/caregiver understand observation status Yes     Communicated BRENNAN with patient/caregiver Yes     People in Home spouse     Facility Arrived From: home     Do you expect to return to your current living situation? Yes     Do you have help at home or someone to help you manage your care at home? Yes     Who are your caregiver(s) and  their phone number(s)? spouse     Prior to hospitilization cognitive status: Alert/Oriented     Current cognitive status: Alert/Oriented     Equipment Currently Used at Home none     Readmission within 30 days? No     Patient currently being followed by outpatient case management? No     Do you currently have service(s) that help you manage your care at home? No     Do you take prescription medications? Yes     Do you have prescription coverage? Yes     Do you have any problems affording any of your prescribed medications? No     Is the patient taking medications as prescribed? yes     Who is going to help you get home at discharge? spouse     How do you get to doctors appointments? car, drives self     Are you on dialysis? No     Do you take coumadin? No     Discharge Plan A Home     Discharge Plan B Home with family     DME Needed Upon Discharge  none     Discharge Plan discussed with: Patient     Discharge Barriers Identified None

## 2023-02-08 NOTE — NURSING
"When going to give nightly meds, upon stating what was beng given, pt states "this is all wrong, I have my own medications and preop nurse told me I could take my own medications." Educated pt on not taking personal medications while in hospital. She states these are the only medications she takes and this is how she takes them.     Nightly:  10mg Trintilex  9mg Budesonide  50mg Spirolactone    Mornin-100mg Tramadol (amount depends on how she feels.)  30mg Adderall    0.5mg Clonazepam PRN    Trintilex and Budesonide not supplied here, ROXY Ceballos, notified and ok'd for pt to take home medication she had with her.     "

## 2023-02-08 NOTE — NURSING
Pt cleared for dc. Pts HL removed with cath intact. Gauze applied and secured with coban. No bleeding from site.  at bedside. Discharge instructions given and reviewed with pt. Voiced understanding. Pt dcd via wheelchair to her husbands car and going home.

## 2023-02-08 NOTE — DISCHARGE SUMMARY
[facility]  Discharge Note  Short Stay    Procedure(s) (LRB):  XI ROBOTIC SACROCOLPOPEXY, ABDOMINAL (N/A)  XI ROBOTIC HYSTERECTOMY,WITH SALPINGO-OOPHORECTOMY (Bilateral)  CYSTOSCOPY (N/A)      OUTCOME: Patient tolerated treatment/procedure well without complication and is now ready for discharge.    DISPOSITION: Home or Self Care    FINAL DIAGNOSIS:  S/P hysterectomy    FOLLOWUP: In clinic    DISCHARGE INSTRUCTIONS:    Discharge Procedure Orders   Diet Cardiac     Pelvic Rest     Lifting restrictions     Notify your health care provider if you experience any of the following:  temperature >100.4     Notify your health care provider if you experience any of the following:     Notify your health care provider if you experience any of the following:  increased confusion or weakness     Notify your health care provider if you experience any of the following:  persistent dizziness, light-headedness, or visual disturbances     Notify your health care provider if you experience any of the following:  worsening rash     Notify your health care provider if you experience any of the following:  severe persistent headache     Notify your health care provider if you experience any of the following:  difficulty breathing or increased cough     Notify your health care provider if you experience any of the following:  redness, tenderness, or signs of infection (pain, swelling, redness, odor or green/yellow discharge around incision site)     Notify your health care provider if you experience any of the following:  severe uncontrolled pain     Notify your health care provider if you experience any of the following:  persistent nausea and vomiting or diarrhea        TIME SPENT ON DISCHARGE: 20 minutes

## 2023-02-10 NOTE — ANESTHESIA POSTPROCEDURE EVALUATION
Anesthesia Post Evaluation    Patient: Glenis Ruggiero    Procedure(s) Performed: Procedure(s) (LRB):  XI ROBOTIC SACROCOLPOPEXY, ABDOMINAL (N/A)  XI ROBOTIC HYSTERECTOMY,WITH SALPINGO-OOPHORECTOMY (Bilateral)  CYSTOSCOPY (N/A)    Final Anesthesia Type: general      Patient location during evaluation: PACU  Patient participation: Yes- Able to Participate  Level of consciousness: awake and alert  Post-procedure vital signs: reviewed and stable  Pain management: adequate  Airway patency: patent    PONV status at discharge: No PONV  Anesthetic complications: no      Cardiovascular status: blood pressure returned to baseline  Respiratory status: unassisted and room air  Hydration status: euvolemic  Follow-up not needed.          Vitals Value Taken Time   /60 02/08/23 0712   Temp 36.6 °C (97.9 °F) 02/08/23 0712   Pulse 71 02/08/23 0751   Resp 17 02/08/23 1111   SpO2 100 % 02/08/23 0751         Event Time   Out of Recovery 17:40:00         Pain/Ronny Score: No data recorded

## 2023-02-13 LAB
FINAL PATHOLOGIC DIAGNOSIS: NORMAL
GROSS: NORMAL
Lab: NORMAL

## 2023-02-17 ENCOUNTER — PATIENT MESSAGE (OUTPATIENT)
Dept: UROGYNECOLOGY | Facility: CLINIC | Age: 53
End: 2023-02-17
Payer: COMMERCIAL

## 2023-02-22 ENCOUNTER — HOSPITAL ENCOUNTER (OUTPATIENT)
Dept: RADIOLOGY | Facility: HOSPITAL | Age: 53
Discharge: HOME | End: 2023-02-22
Attending: NURSE PRACTITIONER
Payer: COMMERCIAL

## 2023-02-22 DIAGNOSIS — R92.8 ABNORMAL MAMMOGRAM: ICD-10-CM

## 2023-02-22 PROCEDURE — 77065 DX MAMMO INCL CAD UNI: CPT | Mod: TC,PO,RT

## 2023-02-23 ENCOUNTER — OFFICE VISIT (OUTPATIENT)
Dept: UROGYNECOLOGY | Facility: CLINIC | Age: 53
End: 2023-02-23
Payer: COMMERCIAL

## 2023-02-23 VITALS
DIASTOLIC BLOOD PRESSURE: 79 MMHG | BODY MASS INDEX: 19.96 KG/M2 | WEIGHT: 127.19 LBS | HEIGHT: 67 IN | HEART RATE: 101 BPM | SYSTOLIC BLOOD PRESSURE: 125 MMHG

## 2023-02-23 DIAGNOSIS — Z09 POSTOP CHECK: Primary | ICD-10-CM

## 2023-02-23 DIAGNOSIS — R35.0 URINARY FREQUENCY: ICD-10-CM

## 2023-02-23 LAB
BILIRUBIN, UA POC OHS: NEGATIVE
BLOOD, UA POC OHS: NEGATIVE
CLARITY, UA POC OHS: CLEAR
COLOR, UA POC OHS: YELLOW
GLUCOSE, UA POC OHS: NEGATIVE
KETONES, UA POC OHS: NEGATIVE
LEUKOCYTES, UA POC OHS: NEGATIVE
NITRITE, UA POC OHS: NEGATIVE
PH, UA POC OHS: 6
PROTEIN, UA POC OHS: NEGATIVE
SPECIFIC GRAVITY, UA POC OHS: >=1.03
UROBILINOGEN, UA POC OHS: 0.2

## 2023-02-23 PROCEDURE — 99024 PR POST-OP FOLLOW-UP VISIT: ICD-10-PCS | Mod: S$GLB,,, | Performed by: NURSE PRACTITIONER

## 2023-02-23 PROCEDURE — 99024 POSTOP FOLLOW-UP VISIT: CPT | Mod: S$GLB,,, | Performed by: NURSE PRACTITIONER

## 2023-02-23 PROCEDURE — 81003 POCT URINALYSIS(INSTRUMENT): ICD-10-PCS | Mod: QW,S$GLB,, | Performed by: NURSE PRACTITIONER

## 2023-02-23 PROCEDURE — 99999 PR PBB SHADOW E&M-EST. PATIENT-LVL IV: ICD-10-PCS | Mod: PBBFAC,,, | Performed by: NURSE PRACTITIONER

## 2023-02-23 PROCEDURE — 99999 PR PBB SHADOW E&M-EST. PATIENT-LVL IV: CPT | Mod: PBBFAC,,, | Performed by: NURSE PRACTITIONER

## 2023-02-23 PROCEDURE — 81003 URINALYSIS AUTO W/O SCOPE: CPT | Mod: QW,S$GLB,, | Performed by: NURSE PRACTITIONER

## 2023-02-23 RX ORDER — LACTULOSE 10 G/15ML
30 SOLUTION ORAL; RECTAL
COMMUNITY
Start: 2023-02-09

## 2023-02-23 NOTE — PROGRESS NOTES
Subjective:       Patient ID: Glenis Ruggiero is a 52 y.o. female.    Chief Complaint: Post-op Evaluation      Glenis Ruggiero is a 52 y.o. female.  Who is 2 weeks postop from Xi Robotic Sacrocolpopexy, Abdominal, Xi Robotic Hysterectomy,with Salpingo-oophorectomy - Bilateral, and Cystoscopy 2/7/2023  with Dr. Briseno.  She feels that she is doing fairly well.  She denies any real pain but does have some tenderness on the right side if she stretches or moves in a certain manner.  She has urinary frequency every 2-3 hours during the day and nocturia x2.  She denies any postvoid fullness at this time.  She has some UUI  on occasion she denies any GIACOMO.  She denies any vaginal discharge or bleeding.  She is taking stool softeners and having bowel movements daily.  She is been trying to take it easy and not do too much.    Review of Systems   Constitutional:  Negative for activity change, fever and unexpected weight change.   HENT:  Negative for hearing loss.    Eyes:  Negative for visual disturbance.   Respiratory:  Negative for shortness of breath and wheezing.    Cardiovascular:  Negative for chest pain, palpitations and leg swelling.   Gastrointestinal:  Negative for abdominal pain, constipation and diarrhea.   Genitourinary:  Positive for frequency and urgency. Negative for dyspareunia, dysuria, vaginal bleeding and vaginal discharge.   Musculoskeletal:  Negative for gait problem and neck pain.   Skin:  Negative for rash and wound.   Allergic/Immunologic: Negative for immunocompromised state.   Neurological:  Negative for tremors, speech difficulty and weakness.   Hematological:  Does not bruise/bleed easily.   Psychiatric/Behavioral:  Negative for agitation and confusion.      Objective:      Physical Exam  Vitals reviewed. Exam conducted with a chaperone present.   Constitutional:       General: She is not in acute distress.     Appearance: She is well-developed.   HENT:      Head: Normocephalic and atraumatic.    Neck:      Thyroid: No thyromegaly.   Pulmonary:      Effort: Pulmonary effort is normal. No respiratory distress.   Abdominal:      Palpations: Abdomen is soft.      Tenderness: There is no abdominal tenderness.      Hernia: No hernia is present.   Musculoskeletal:         General: Normal range of motion.      Cervical back: Normal range of motion.   Skin:     General: Skin is warm and dry.      Findings: No rash.      Comments: Incision sites are C/D/I.  No redness/discharge/drainage noted.   Neurological:      Mental Status: She is alert and oriented to person, place, and time.   Psychiatric:         Mood and Affect: Mood normal.         Behavior: Behavior normal.         Thought Content: Thought content normal.     Pelvic Exam:  V: No lesions. No palpable nodes.   Va:  No discharge or bleeding.  Good length and support.  Meatus:No caruncle or stenosis  Urethra: Non tender. No suburethral masses.  Cx/Cuff: Normal   Uterus:  Surgically absent.  Ad: No mass or tenderness.  Levators :Symmetrical. Normal tone. Non tender.  BL: Non tender  RV: No hemorrhoids.      Assessment:       1. Postop check    2. Urinary frequency          Plan:       Postop check NP reviewed postop limitations/restrictions with patient.  Patient verbalized understanding.  -     POCT Urinalysis(Instrument)    Urinary frequency-monitor    RTC 4 weeks with Dr. Briseno

## 2023-03-22 ENCOUNTER — OFFICE VISIT (OUTPATIENT)
Dept: UROGYNECOLOGY | Facility: CLINIC | Age: 53
End: 2023-03-22
Payer: COMMERCIAL

## 2023-03-22 VITALS — WEIGHT: 127.19 LBS | BODY MASS INDEX: 19.96 KG/M2 | HEIGHT: 67 IN

## 2023-03-22 DIAGNOSIS — Z09 POSTOP CHECK: Primary | ICD-10-CM

## 2023-03-22 PROCEDURE — 99024 PR POST-OP FOLLOW-UP VISIT: ICD-10-PCS | Mod: S$GLB,,, | Performed by: OBSTETRICS & GYNECOLOGY

## 2023-03-22 PROCEDURE — 99999 PR PBB SHADOW E&M-EST. PATIENT-LVL III: CPT | Mod: PBBFAC,,, | Performed by: OBSTETRICS & GYNECOLOGY

## 2023-03-22 PROCEDURE — 99999 PR PBB SHADOW E&M-EST. PATIENT-LVL III: ICD-10-PCS | Mod: PBBFAC,,, | Performed by: OBSTETRICS & GYNECOLOGY

## 2023-03-22 PROCEDURE — 99024 POSTOP FOLLOW-UP VISIT: CPT | Mod: S$GLB,,, | Performed by: OBSTETRICS & GYNECOLOGY

## 2023-03-22 NOTE — PROGRESS NOTES
Subjective:      Patient ID: Glenis Ruggiero is a 52 y.o. female.    Chief Complaint:  No chief complaint on file.      History of Present Illness  Six weeks postop from a robotic sacral colpopexy doing well there have been no episodes of leakage of urine coughing and sneezing but there have been episodes of urgency          Past Medical History:   Diagnosis Date    ADD (attention deficit disorder)     Anxiety     Depression     Fibromyalgia     Prediabetes     Scoliosis     Ulcerative colitis        Past Surgical History:   Procedure Laterality Date    AUGMENTATION OF BREAST      BREAST SURGERY      implants and lift    CYSTOSCOPY N/A 2023    Procedure: CYSTOSCOPY;  Surgeon: Jeff Briseno MD;  Location: [facility] OR;  Service: OB/GYN;  Laterality: N/A;    NASAL SEPTUM SURGERY      ROBOT-ASSISTED LAPAROSCOPIC ABDOMINAL SACROCOLPOPEXY USING DA RICHIE XI N/A 2023    Procedure: XI ROBOTIC SACROCOLPOPEXY, ABDOMINAL;  Surgeon: Jeff Briseno MD;  Location: [facility] OR;  Service: OB/GYN;  Laterality: N/A;    TONSILLECTOMY      XI ROBOTIC HYSTERECTOMY, WITH SALPINGO-OOPHORECTOMY Bilateral 2023    Procedure: XI ROBOTIC HYSTERECTOMY,WITH SALPINGO-OOPHORECTOMY;  Surgeon: Jeff Briseno MD;  Location: [facility] OR;  Service: OB/GYN;  Laterality: Bilateral;       GYN & OB History  Patient's last menstrual period was 2014.   Date of Last Pap: No result found    OB History    Para Term  AB Living   3 3 3         SAB IAB Ectopic Multiple Live Births                  # Outcome Date GA Lbr Marco Antonio/2nd Weight Sex Delivery Anes PTL Lv   3 Term            2 Term            1 Term                Health Maintenance         Date Due Completion Date    COVID-19 Vaccine (1) Never done ---    Pneumococcal Vaccines (Age 0-64) (1 - PCV) Never done ---    TETANUS VACCINE Never done ---    Colorectal Cancer Screening Never done ---    Shingles Vaccine (1 of 2) Never done ---    Lipid Panel 2019    Influenza  "Vaccine (1) 09/01/2022 11/22/2020    Mammogram 02/22/2024 2/22/2023            Family History   Problem Relation Age of Onset    Ephraim syndrome Son     Thyroid disease Maternal Aunt     Breast cancer Maternal Aunt     Cancer Maternal Aunt 45        breast cancer    Lupus Neg Hx     Psoriasis Neg Hx     Rheum arthritis Neg Hx        Social History     Socioeconomic History    Marital status:    Tobacco Use    Smoking status: Some Days     Types: Cigarettes, Vaping with nicotine    Smokeless tobacco: Never    Tobacco comments:     24 year old son with Jamaal's disease which causes mental retardation; she is ; 3 children total   Substance and Sexual Activity    Alcohol use: Yes     Comment: Couple drinks every 2 weeks    Drug use: No    Sexual activity: Yes     Partners: Male       Review of Systems  Review of Systems  Urge incontinence episodes urge incontinence episodes     Objective:   Ht 5' 7" (1.702 m)   Wt 57.7 kg (127 lb 3.3 oz)   LMP 08/18/2014   BMI 19.92 kg/m²     Physical Exam   Completely normal anatomy perfect anatomy well healed  Assessment:     1. Postop check            Plan:     1. Postop check      Patient has been doing very well.    I am going to give her gem Celia 1 tablet daily to see where the urgency goes just out of curiosity.    Patient will send me a message in 4 weeks patient is going to resume exercise.    Will see how she does with the GIACOMO I am confident that I patient will do very well however if there is a need we will move forward with M U.S. retropubic as needed patient noted understanding greatly appreciated time spent  There are no Patient Instructions on file for this visit.        "

## 2023-04-26 ENCOUNTER — PATIENT MESSAGE (OUTPATIENT)
Dept: UROGYNECOLOGY | Facility: CLINIC | Age: 53
End: 2023-04-26
Payer: COMMERCIAL

## 2023-05-05 ENCOUNTER — PATIENT MESSAGE (OUTPATIENT)
Dept: UROGYNECOLOGY | Facility: CLINIC | Age: 53
End: 2023-05-05
Payer: COMMERCIAL

## 2023-05-10 ENCOUNTER — OFFICE VISIT (OUTPATIENT)
Dept: UROGYNECOLOGY | Facility: CLINIC | Age: 53
End: 2023-05-10
Payer: COMMERCIAL

## 2023-05-10 VITALS
BODY MASS INDEX: 19.96 KG/M2 | SYSTOLIC BLOOD PRESSURE: 122 MMHG | DIASTOLIC BLOOD PRESSURE: 80 MMHG | HEIGHT: 67 IN | HEART RATE: 100 BPM | WEIGHT: 127.19 LBS

## 2023-05-10 DIAGNOSIS — N39.46 MIXED INCONTINENCE URGE AND STRESS (MALE)(FEMALE): Primary | ICD-10-CM

## 2023-05-10 PROCEDURE — 99999 PR PBB SHADOW E&M-EST. PATIENT-LVL IV: CPT | Mod: PBBFAC,,, | Performed by: OBSTETRICS & GYNECOLOGY

## 2023-05-10 PROCEDURE — 99213 PR OFFICE/OUTPT VISIT, EST, LEVL III, 20-29 MIN: ICD-10-PCS | Mod: S$GLB,,, | Performed by: OBSTETRICS & GYNECOLOGY

## 2023-05-10 PROCEDURE — 99213 OFFICE O/P EST LOW 20 MIN: CPT | Mod: S$GLB,,, | Performed by: OBSTETRICS & GYNECOLOGY

## 2023-05-10 PROCEDURE — 99999 PR PBB SHADOW E&M-EST. PATIENT-LVL IV: ICD-10-PCS | Mod: PBBFAC,,, | Performed by: OBSTETRICS & GYNECOLOGY

## 2023-05-10 NOTE — PROGRESS NOTES
Subjective:      Patient ID: Glenis Ruggiero is a 52 y.o. female.    Chief Complaint:  No chief complaint on file.      History of Present Illness  Greater than 6 weeks postop from robotic reconstruction indication was advanced pelvic organ prolapse stage III anterior compartment predominant.    Since then patient has been experiencing leakage of urine and she is frustrated by this.              Past Medical History:   Diagnosis Date    ADD (attention deficit disorder)     Anxiety     Depression     Fibromyalgia     Prediabetes     Scoliosis     Ulcerative colitis        Past Surgical History:   Procedure Laterality Date    AUGMENTATION OF BREAST      BREAST SURGERY      implants and lift    CYSTOSCOPY N/A 2023    Procedure: CYSTOSCOPY;  Surgeon: Jeff Briseno MD;  Location: [facility] OR;  Service: OB/GYN;  Laterality: N/A;    NASAL SEPTUM SURGERY      ROBOT-ASSISTED LAPAROSCOPIC ABDOMINAL SACROCOLPOPEXY USING DA RICHIE XI N/A 2023    Procedure: XI ROBOTIC SACROCOLPOPEXY, ABDOMINAL;  Surgeon: Jeff Briseno MD;  Location: [facility] OR;  Service: OB/GYN;  Laterality: N/A;    TONSILLECTOMY      XI ROBOTIC HYSTERECTOMY, WITH SALPINGO-OOPHORECTOMY Bilateral 2023    Procedure: XI ROBOTIC HYSTERECTOMY,WITH SALPINGO-OOPHORECTOMY;  Surgeon: Jeff Briseno MD;  Location: [facility] OR;  Service: OB/GYN;  Laterality: Bilateral;       GYN & OB History  Patient's last menstrual period was 2014.   Date of Last Pap: No result found    OB History    Para Term  AB Living   3 3 3         SAB IAB Ectopic Multiple Live Births                  # Outcome Date GA Lbr Marco Antonio/2nd Weight Sex Delivery Anes PTL Lv   3 Term            2 Term            1 Term                Health Maintenance         Date Due Completion Date    COVID-19 Vaccine (1) Never done ---    Pneumococcal Vaccines (Age 0-64) (1 - PCV) Never done ---    TETANUS VACCINE Never done ---    Colorectal Cancer Screening Never done ---    Shingles Vaccine (1 of  "2) Never done ---    Lipid Panel 05/09/2019 5/9/2014    Influenza Vaccine (Season Ended) 09/01/2023 11/22/2020    Mammogram 02/22/2024 2/22/2023            Family History   Problem Relation Age of Onset    Ephraim syndrome Son     Thyroid disease Maternal Aunt     Breast cancer Maternal Aunt     Cancer Maternal Aunt 45        breast cancer    Lupus Neg Hx     Psoriasis Neg Hx     Rheum arthritis Neg Hx        Social History     Socioeconomic History    Marital status:    Tobacco Use    Smoking status: Some Days     Types: Cigarettes, Vaping with nicotine    Smokeless tobacco: Never    Tobacco comments:     24 year old son with Jamaal's disease which causes mental retardation; she is ; 3 children total   Substance and Sexual Activity    Alcohol use: Yes     Comment: Couple drinks every 2 weeks    Drug use: No    Sexual activity: Yes     Partners: Male       Review of Systems  Review of Systems  Leakage of urine     Objective:   /80   Pulse 100   Ht 5' 7" (1.702 m)   Wt 57.7 kg (127 lb 3.3 oz)   LMP 08/18/2014   BMI 19.92 kg/m²     Physical Exam   Deferred  Assessment:     1. Mixed incontinence urge and stress (male)(female)            Plan:     1. Mixed incontinence urge and stress (male)(female)      I am applying mixed urinary incontinence at this time there is GIACOMO elements this is not obvious last visit were was I thought OAB urge predominant patient states gem Celia did not work we were able to procure a 50 mg sample of her bed trick she will stop this 3 days before I carry out urodynamics next available which I believe this May 25th all questions that were asked were addressed patient appreciated the time and jesus discussion all this was discussed patient before which she agrees.  There are no Patient Instructions on file for this visit.        "

## 2023-05-25 ENCOUNTER — ANESTHESIA (OUTPATIENT)
Dept: SURGERY | Facility: HOSPITAL | Age: 53
End: 2023-05-25
Payer: COMMERCIAL

## 2023-05-25 ENCOUNTER — ANESTHESIA EVENT (OUTPATIENT)
Dept: SURGERY | Facility: HOSPITAL | Age: 53
End: 2023-05-25
Payer: COMMERCIAL

## 2023-05-25 ENCOUNTER — HOSPITAL ENCOUNTER (OUTPATIENT)
Facility: HOSPITAL | Age: 53
Discharge: HOME | End: 2023-05-25
Attending: INTERNAL MEDICINE | Admitting: INTERNAL MEDICINE
Payer: COMMERCIAL

## 2023-05-25 VITALS
TEMPERATURE: 97 F | RESPIRATION RATE: 16 BRPM | HEIGHT: 67 IN | WEIGHT: 122 LBS | SYSTOLIC BLOOD PRESSURE: 139 MMHG | HEART RATE: 65 BPM | OXYGEN SATURATION: 94 % | DIASTOLIC BLOOD PRESSURE: 62 MMHG | BODY MASS INDEX: 19.15 KG/M2

## 2023-05-25 PROCEDURE — 25000003 PHARM REV CODE 250: Performed by: INTERNAL MEDICINE

## 2023-05-25 PROCEDURE — D9220A PRA ANESTHESIA: Mod: CRNA,,, | Performed by: NURSE ANESTHETIST, CERTIFIED REGISTERED

## 2023-05-25 PROCEDURE — 27200043 HC FORCEPS, BIOPSY: Performed by: INTERNAL MEDICINE

## 2023-05-25 PROCEDURE — 45380 COLONOSCOPY AND BIOPSY: CPT | Performed by: INTERNAL MEDICINE

## 2023-05-25 PROCEDURE — D9220A PRA ANESTHESIA: Mod: ANES,,, | Performed by: ANESTHESIOLOGY

## 2023-05-25 PROCEDURE — 43450 DILATE ESOPHAGUS 1/MULT PASS: CPT | Performed by: INTERNAL MEDICINE

## 2023-05-25 PROCEDURE — 43239 EGD BIOPSY SINGLE/MULTIPLE: CPT | Performed by: INTERNAL MEDICINE

## 2023-05-25 PROCEDURE — 63600175 PHARM REV CODE 636 W HCPCS: Performed by: NURSE ANESTHETIST, CERTIFIED REGISTERED

## 2023-05-25 PROCEDURE — D9220A PRA ANESTHESIA: ICD-10-PCS | Mod: CRNA,,, | Performed by: NURSE ANESTHETIST, CERTIFIED REGISTERED

## 2023-05-25 PROCEDURE — 37000008 HC ANESTHESIA 1ST 15 MINUTES: Performed by: INTERNAL MEDICINE

## 2023-05-25 PROCEDURE — 37000009 HC ANESTHESIA EA ADD 15 MINS: Performed by: INTERNAL MEDICINE

## 2023-05-25 PROCEDURE — D9220A PRA ANESTHESIA: ICD-10-PCS | Mod: ANES,,, | Performed by: ANESTHESIOLOGY

## 2023-05-25 RX ORDER — PROPOFOL 10 MG/ML
INJECTION, EMULSION INTRAVENOUS
Status: DISCONTINUED | OUTPATIENT
Start: 2023-05-25 | End: 2023-05-25

## 2023-05-25 RX ORDER — SODIUM CHLORIDE, SODIUM LACTATE, POTASSIUM CHLORIDE, CALCIUM CHLORIDE 600; 310; 30; 20 MG/100ML; MG/100ML; MG/100ML; MG/100ML
INJECTION, SOLUTION INTRAVENOUS CONTINUOUS PRN
Status: DISCONTINUED | OUTPATIENT
Start: 2023-05-25 | End: 2023-05-25

## 2023-05-25 RX ADMIN — SODIUM CHLORIDE, SODIUM LACTATE, POTASSIUM CHLORIDE, AND CALCIUM CHLORIDE: .6; .31; .03; .02 INJECTION, SOLUTION INTRAVENOUS at 09:05

## 2023-05-25 RX ADMIN — PROPOFOL 20 MG: 10 INJECTION, EMULSION INTRAVENOUS at 10:05

## 2023-05-25 RX ADMIN — PROPOFOL 100 MG: 10 INJECTION, EMULSION INTRAVENOUS at 09:05

## 2023-05-25 RX ADMIN — PROPOFOL 50 MG: 10 INJECTION, EMULSION INTRAVENOUS at 10:05

## 2023-05-25 NOTE — [employer]ATION PATIENT INSTRUCTIONS
Discharge Summary/Instructions after an Endoscopic Procedure  Patient Name: Glenis Ruggiero  Patient MRN: [id number]  Patient YOB: 1970     Thursday, May 25, 2023  James Banks MD  RESTRICTIONS:  During your procedure today, you received medications for sedation.  These   medications may affect your judgment, balance and coordination.  Therefore,   for 24 hours, you have the following restrictions:   - DO NOT drive a car, operate machinery, make legal/financial decisions,   sign important papers or drink alcohol.    ACTIVITY:  Today: no heavy lifting, straining or running due to procedural   sedation/anesthesia.  The following day: return to full activity including work.  DIET:  Eat and drink normally unless instructed otherwise.     TREATMENT FOR COMMON SIDE EFFECTS:  - Mild abdominal pain, nausea, belching, bloating or excessive gas:  rest,   eat lightly and use a heating pad.  - Sore Throat: treat with throat lozenges and/or gargle with warm salt   water.  - Because air was used during the procedure, expelling large amounts of air   from your rectum or belching is normal.  - If a bowel prep was taken, you may not have a bowel movement for 1-3 days.    This is normal.  SYMPTOMS TO WATCH FOR AND REPORT TO YOUR PHYSICIAN:  1. Abdominal pain or bloating, other than gas cramps.  2. Chest pain.  3. Back pain.  4. Signs of infection such as: chills or fever occurring within 24 hours   after the procedure.  5. Rectal bleeding, which would show as bright red, maroon, or black stools.   (A tablespoon of blood from the rectum is not serious, especially if   hemorrhoids are present.)  6. Vomiting.  7. Weakness or dizziness.  GO DIRECTLY TO THE NEAREST EMERGENCY ROOM IF YOU HAVE ANY OF THE FOLLOWING:      Difficulty breathing              Chills and/or fever over 101 F   Persistent vomiting and/or vomiting blood   Severe abdominal pain   Severe chest pain   Black, tarry stools   Bleeding- more than one tablespoon   Any  other symptom or condition that you feel may need urgent attention  Your doctor recommends these additional instructions:  If any biopsies were taken, your doctors clinic will contact you in 1 to 2   weeks with any results.  - Patient has a contact number available for emergencies.  The signs and   symptoms of potential delayed complications were discussed with the   patient.  Return to normal activities tomorrow.  Written discharge   instructions were provided to the patient.   - Resume previous diet.   - Continue present medications.   - Await pathology results.   - Repeat colonoscopy date to be determined after pending pathology results   are reviewed for surveillance.   - Return to GI clinic in 2 weeks.   - Discharge patient to home (with escort).  For questions, problems or results please call your physician - James Banks MD at Work:  [phone].  [facility], EMERGENCY ROOM PHONE NUMBER: [phone]  IF A COMPLICATION OR EMERGENCY SITUATION ARISES AND YOU ARE UNABLE TO REACH   YOUR PHYSICIAN - GO DIRECTLY TO THE EMERGENCY ROOM.  MD James Cabrera MD  5/25/2023 10:40:09 AM  This report has been verified and signed electronically.  Dear patient,  As a result of recent federal legislation (The Federal Cures Act), you may   receive lab or pathology results from your procedure in your [facility]   account before your physician is able to contact you. Your physician or   their representative will relay the results to you with their   recommendations at their soonest availability.  Thank you,  PROVATION

## 2023-05-25 NOTE — PROVATION PATIENT INSTRUCTIONS
Discharge Summary/Instructions after an Endoscopic Procedure  Patient Name: Glenis Ruggiero  Patient MRN: [id number]  Patient YOB: 1970     Thursday, May 25, 2023  James Banks MD  RESTRICTIONS:  During your procedure today, you received medications for sedation.  These   medications may affect your judgment, balance and coordination.  Therefore,   for 24 hours, you have the following restrictions:   - DO NOT drive a car, operate machinery, make legal/financial decisions,   sign important papers or drink alcohol.    ACTIVITY:  Today: no heavy lifting, straining or running due to procedural   sedation/anesthesia.  The following day: return to full activity including work.  DIET:  Eat and drink normally unless instructed otherwise.     TREATMENT FOR COMMON SIDE EFFECTS:  - Mild abdominal pain, nausea, belching, bloating or excessive gas:  rest,   eat lightly and use a heating pad.  - Sore Throat: treat with throat lozenges and/or gargle with warm salt   water.  - Because air was used during the procedure, expelling large amounts of air   from your rectum or belching is normal.  - If a bowel prep was taken, you may not have a bowel movement for 1-3 days.    This is normal.  SYMPTOMS TO WATCH FOR AND REPORT TO YOUR PHYSICIAN:  1. Abdominal pain or bloating, other than gas cramps.  2. Chest pain.  3. Back pain.  4. Signs of infection such as: chills or fever occurring within 24 hours   after the procedure.  5. Rectal bleeding, which would show as bright red, maroon, or black stools.   (A tablespoon of blood from the rectum is not serious, especially if   hemorrhoids are present.)  6. Vomiting.  7. Weakness or dizziness.  GO DIRECTLY TO THE NEAREST EMERGENCY ROOM IF YOU HAVE ANY OF THE FOLLOWING:      Difficulty breathing              Chills and/or fever over 101 F   Persistent vomiting and/or vomiting blood   Severe abdominal pain   Severe chest pain   Black, tarry stools   Bleeding- more than one tablespoon   Any  other symptom or condition that you feel may need urgent attention  Your doctor recommends these additional instructions:  If any biopsies were taken, your doctors clinic will contact you in 1 to 2   weeks with any results.  - Patient has a contact number available for emergencies.  The signs and   symptoms of potential delayed complications were discussed with the   patient.  Return to normal activities tomorrow.  Written discharge   instructions were provided to the patient.   - Resume previous diet.   - Continue present medications.   - Await pathology results.   - Discharge patient to home (with escort).  For questions, problems or results please call your physician - James Banks MD at Work:  [phone].  [facility], EMERGENCY ROOM PHONE NUMBER: [phone]  IF A COMPLICATION OR EMERGENCY SITUATION ARISES AND YOU ARE UNABLE TO REACH   YOUR PHYSICIAN - GO DIRECTLY TO THE EMERGENCY ROOM.  MD James Cabrera MD  5/25/2023 10:36:03 AM  This report has been verified and signed electronically.  Dear patient,  As a result of recent federal legislation (The Federal Cures Act), you may   receive lab or pathology results from your procedure in your [facility]   account before your physician is able to contact you. Your physician or   their representative will relay the results to you with their   recommendations at their soonest availability.  Thank you,  PROVATION

## 2023-05-25 NOTE — TRANSFER OF CARE
"Anesthesia Transfer of Care Note    Patient: Glenis Ruggiero    Procedure(s) Performed: Procedure(s) (LRB):  EGD (ESOPHAGOGASTRODUODENOSCOPY) (N/A)  COLONOSCOPY (N/A)    Patient location: GI    Anesthesia Type: general    Transport from OR: Transported from OR on room air with adequate spontaneous ventilation    Post pain: adequate analgesia    Post assessment: no apparent anesthetic complications    Post vital signs: stable    Level of consciousness: awake, alert and oriented    Nausea/Vomiting: no nausea/vomiting    Complications: none    Transfer of care protocol was followed      Last vitals:   Visit Vitals  /61   Pulse 73   Temp 36.3 °C (97.3 °F) (Oral)   Resp 16   Ht 5' 7" (1.702 m)   Wt 55.3 kg (122 lb)   LMP 08/18/2014   SpO2 100%   BMI 19.11 kg/m²     "

## 2023-05-25 NOTE — ANESTHESIA PREPROCEDURE EVALUATION
05/25/2023  Glenis Ruggiero is a 52 y.o., female.      Pre-op Assessment    I have reviewed the Patient Summary Reports.     I have reviewed the Nursing Notes. I have reviewed the NPO Status.   I have reviewed the Medications.     Review of Systems  Anesthesia Hx:  Patient woke up with bruise lip after general anesthesia 3 months ago Denies Family Hx of Anesthesia complications.   Denies Personal Hx of Anesthesia complications.   Social:  Smoker    Hematology/Oncology:  Hematology Normal   Oncology Normal     EENT/Dental:   Patient has 8 upper veneers   Cardiovascular:   Hypertension ( Prescribed medications as needed for high blood pressure patient has not had to use in quite awhile.)    Pulmonary:  Pulmonary Normal    Renal/:  Renal/ Normal     Hepatic/GI:   Denies GERD. Ulcerative colitis, occasional dysphagia and stomach pain   Musculoskeletal:  Musculoskeletal Normal Fibromyalgia   Neurological:   Chronic Pain Syndrome ( lower back and hip, for which she takes tramadol, last dose today)   Endocrine:  Endocrine Normal    Psych:   Psychiatric History (ADD) anxiety depression          Physical Exam  General: Well nourished, Cooperative, Alert and Oriented    Airway:  Mallampati: I   Mouth Opening: Normal  TM Distance: > 6 cm  Tongue: Normal  Neck ROM: Normal ROM    Dental:  Intact    Chest/Lungs:  Clear to auscultation, Normal Respiratory Rate    Heart:  Rate: Normal  Rhythm: Regular Rhythm  Sounds: Normal        Anesthesia Plan  Type of Anesthesia, risks & benefits discussed:    Anesthesia Type: Gen Natural Airway  Intra-op Monitoring Plan: Standard ASA Monitors  Induction:  IV  Informed Consent: Informed consent signed with the Patient and all parties understand the risks and agree with anesthesia plan.  All questions answered.   ASA Score: 3  Anesthesia Plan Notes: Procedural oxygen mask    Ready For  Surgery From Anesthesia Perspective.     .

## 2023-05-25 NOTE — ANESTHESIA POSTPROCEDURE EVALUATION
Anesthesia Post Evaluation    Patient: Glenis Ruggiero    Procedure(s) Performed: Procedure(s) (LRB):  EGD (ESOPHAGOGASTRODUODENOSCOPY) (N/A)  COLONOSCOPY (N/A)    Final Anesthesia Type: general      Patient location during evaluation: GI PACU  Patient participation: Yes- Able to Participate  Level of consciousness: awake and alert  Post-procedure vital signs: reviewed and stable  Pain management: adequate  Airway patency: patent    PONV status at discharge: No PONV  Anesthetic complications: no      Cardiovascular status: stable  Respiratory status: unassisted  Hydration status: euvolemic  Follow-up not needed.          Vitals Value Taken Time   /61 05/25/23 1041   Temp 36.2 °C (97.1 °F) 05/25/23 1038   Pulse 73 05/25/23 1042   Resp 16 05/25/23 1038   SpO2 96 % 05/25/23 1042   Vitals shown include unvalidated device data.      No case tracking events are documented in the log.      Pain/Ronny Score: No data recorded

## 2023-05-25 NOTE — H&P
GASTROENTEROLOGY PRE-PROCEDURE H&P NOTE  Patient Name: Glenis Ruggiero  Patient MRN: [id number]  Patient : 1970    Service date: 2023    PCP: Snheal Grullon NP    No chief complaint on file.      HPI: Patient is a 52 y.o. female with PMHx as below here for evaluation of h/o colitis and early satiety.     Past Medical History:  Past Medical History:   Diagnosis Date    ADD (attention deficit disorder)     Anxiety     Depression     Fibromyalgia     Hypertension     Prediabetes     Scoliosis     Ulcerative colitis         Past Surgical History:  Past Surgical History:   Procedure Laterality Date    AUGMENTATION OF BREAST      BREAST SURGERY      implants and lift    CYSTOSCOPY N/A 2023    Procedure: CYSTOSCOPY;  Surgeon: Jeff Briseno MD;  Location: [facility] OR;  Service: OB/GYN;  Laterality: N/A;    NASAL SEPTUM SURGERY      ROBOT-ASSISTED LAPAROSCOPIC ABDOMINAL SACROCOLPOPEXY USING DA RICHIE XI N/A 2023    Procedure: XI ROBOTIC SACROCOLPOPEXY, ABDOMINAL;  Surgeon: Jeff Briseno MD;  Location: [facility] OR;  Service: OB/GYN;  Laterality: N/A;    TONSILLECTOMY      XI ROBOTIC HYSTERECTOMY, WITH SALPINGO-OOPHORECTOMY Bilateral 2023    Procedure: XI ROBOTIC HYSTERECTOMY,WITH SALPINGO-OOPHORECTOMY;  Surgeon: Jeff Briseno MD;  Location: [facility] OR;  Service: OB/GYN;  Laterality: Bilateral;        Home Medications:  Medications Prior to Admission   Medication Sig Dispense Refill Last Dose    ADDERALL XR 30 mg 24 hr capsule Take by mouth every morning.   2023    budesonide (ENTOCORT EC) 3 mg capsule TAKE 3 CAPSULES BY MOUTH ONCE A DAY   2023    clonazePAM (KLONOPIN) 0.5 MG tablet clonazepam 0.5 mg tablet   TAKE 1 TABLET BY MOUTH EVERY DAY AS NEEDED   Past Week    fluticasone (FLONASE) 50 mcg/actuation nasal spray 1 spray by Each Nare route once daily. 1 Bottle 0 Past Month    lactulose (CHRONULAC) 10 gram/15 mL solution Take 30 mLs by mouth.   Past Month    lisinopriL 10 MG tablet prn   Past Month     naratriptan (AMERGE) 2.5 MG tablet as needed.   Past Week    ondansetron (ZOFRAN) 4 MG tablet ondansetron HCl 4 mg tablet   TAKE 1 TABLET BY MOUTH EVERY DAY AS NEEDED   Past Week    RESTASIS 0.05 % ophthalmic emulsion    Past Week    testosterone (ANDROGEL) 1 % (50 mg/5 gram) GlPk testosterone 50 mg/5 gram (1 %) transdermal gel   APPLY EXTERNALLY TO SPECIFIC AREA OF SKIN   Past Week    tramadol (ULTRAM) 50 mg tablet Take 50 mg by mouth 2 (two) times daily as needed.  3 5/25/2023 at 0630    TRINTELLIX 10 mg Tab Take 1 tablet by mouth.   Past Week    adalimumab (HUMIRA,CF,) 40 mg/0.4 mL SyKt Humira(CF) 40 mg/0.4 mL subcutaneous syringe kit   More than a month    estradioL (ESTRACE) 0.01 % (0.1 mg/gram) vaginal cream Place 1 g vaginally every Mon, Wed, Fri. 42 g 4     estradioL (EVAMIST) 1.53 mg/spray (1.7%) transdermal spray Evamist 1.53 mg/spray (1.7 %) transdermal spray   APPLY ONE SPRAY EVERY DAY BY TOPICAL ROUTE       insulin detemir U-100 (LEVEMIR FLEXTOUCH U-100 INSULN) 100 unit/mL (3 mL) InPn pen Levemir FlexTouch U-100 Insulin 100 unit/mL (3 mL) subcutaneous pen       minocycline (MINOCIN,DYNACIN) 50 MG Cap Take 1 capsule by mouth 2 (two) times a day. prn   More than a month    mupirocin (BACTROBAN) 2 % ointment 3 (three) times daily. Apply to affected area   More than a month    progesterone (PROMETRIUM) 100 MG capsule every evening.       triamcinolone acetonide 0.1% (KENALOG) 0.1 % paste   2                Review of patient's allergies indicates:  No Known Allergies    Social History:   Social History     Occupational History    Not on file   Tobacco Use    Smoking status: Some Days     Types: Cigarettes, Vaping with nicotine    Smokeless tobacco: Never    Tobacco comments:     24 year old son with Jamaal's disease which causes mental retardation; she is ; 3 children total   Substance and Sexual Activity    Alcohol use: Yes     Comment: Couple drinks every 2 weeks    Drug use: No    Sexual  activity: Yes     Partners: Male       Family History:   Family History   Problem Relation Age of Onset    Ephraim syndrome Son     Thyroid disease Maternal Aunt     Breast cancer Maternal Aunt     Cancer Maternal Aunt 45        breast cancer    Lupus Neg Hx     Psoriasis Neg Hx     Rheum arthritis Neg Hx        Review of Systems:  A 10 point review of systems was performed and was normal, except as mentioned in the HPI, including constitutional, HEENT, heme, lymph, cardiovascular, respiratory, gastrointestinal, genitourinary, neurologic, endocrine, psychiatric and musculoskeletal.      OBJECTIVE:    Physical Exam:  24 Hour Vital Sign Ranges:    Most recent vitals: LMP 08/18/2014    GEN: well-developed, well-nourished, awake and alert, non-toxic appearing adult  HEENT: PERRL, sclera anicteric, oral mucosa pink and moist without lesion  NECK: trachea midline; Good ROM  CV: regular rate and rhythm, no murmurs or gallops  RESP: clear to auscultation bilaterally, no wheezes, rhonci or rales  ABD: soft, non-tender, non-distended, normal bowel sounds  EXT: no swelling or edema, 2+ pulses distally  SKIN: no rashes or jaundice  PSYCH: normal affect    Labs:   No results for input(s): WBC, MCV, PLT in the last 72 hours.    Invalid input(s): HGBAU  No results for input(s): NA, K, CL, CO2, BUN, GLU in the last 72 hours.    Invalid input(s): CREA  No results for input(s): ALB in the last 72 hours.    Invalid input(s): ALKP, SGOT, SGPT, TBIL, DBIL, TPRO  No results for input(s): PT, INR, PTT in the last 72 hours.      IMPRESSION / RECOMMENDATIONS:  Colitis/early satiety  proceed  with interventions as warranted.   RIsks, benefits, alternatives discussed in detail regarding upcoming procedures and sedation. Some of the more common endoscopic complications include but not limited to immediate or delayed perforation, bleeding, infections, pain, inadvertent injury to surrounding tissue / organs and possible need for surgical  evaluation. Patient expressed understanding, all questions answered and will proceed with procedure as planned.     James Banks  5/25/2023  8:10 AM

## 2023-06-09 ENCOUNTER — TELEPHONE (OUTPATIENT)
Dept: UROGYNECOLOGY | Facility: CLINIC | Age: 53
End: 2023-06-09
Payer: COMMERCIAL

## 2023-06-09 ENCOUNTER — TELEPHONE (OUTPATIENT)
Dept: PSYCHIATRY | Facility: CLINIC | Age: 53
End: 2023-06-09
Payer: COMMERCIAL

## 2023-06-09 DIAGNOSIS — R35.0 URINARY FREQUENCY: Primary | ICD-10-CM

## 2023-06-09 NOTE — TELEPHONE ENCOUNTER
Pt called to schedule an appt. I advised her that our department requires a referral from an [facility] provider. She said never mind and hung up.

## 2023-06-09 NOTE — TELEPHONE ENCOUNTER
----- Message from Alexsandra Temple sent at 6/9/2023  2:48 PM CDT -----    ----- Message -----  From: Alma Turner  Sent: 6/9/2023   2:34 PM CDT  To: Finn Aguilar Staff    Type:  Patient Returning Call    Who Called:  pt  Who Left Message for Patient:  Domonique  Does the patient know what this is regarding?:  yes  Best Call Back Number: [phone]  Additional Information:  please call and advise--thank you

## 2023-06-12 RX ORDER — MIRABEGRON 50 MG/1
1 TABLET, FILM COATED, EXTENDED RELEASE ORAL DAILY
Qty: 30 TABLET | Refills: 11 | Status: SHIPPED | OUTPATIENT
Start: 2023-06-12 | End: 2024-06-11

## 2023-06-21 ENCOUNTER — OFFICE VISIT (OUTPATIENT)
Dept: UROGYNECOLOGY | Facility: CLINIC | Age: 53
End: 2023-06-21
Payer: COMMERCIAL

## 2023-06-21 VITALS — HEIGHT: 67 IN | BODY MASS INDEX: 19.14 KG/M2 | WEIGHT: 121.94 LBS

## 2023-06-21 DIAGNOSIS — N39.46 MIXED INCONTINENCE URGE AND STRESS (MALE)(FEMALE): ICD-10-CM

## 2023-06-21 PROCEDURE — 51728 CYSTOMETROGRAM W/VP: CPT | Mod: S$GLB,,, | Performed by: OBSTETRICS & GYNECOLOGY

## 2023-06-21 PROCEDURE — 99999 PR PBB SHADOW E&M-EST. PATIENT-LVL III: ICD-10-PCS | Mod: PBBFAC,,, | Performed by: OBSTETRICS & GYNECOLOGY

## 2023-06-21 PROCEDURE — 51797 PR VOIDING PRESS STUDY INTRA-ABDOMINAL VOID: ICD-10-PCS | Mod: S$GLB,,, | Performed by: OBSTETRICS & GYNECOLOGY

## 2023-06-21 PROCEDURE — 99999 PR PBB SHADOW E&M-EST. PATIENT-LVL III: CPT | Mod: PBBFAC,,, | Performed by: OBSTETRICS & GYNECOLOGY

## 2023-06-21 PROCEDURE — 51741 PR UROFLOWMETRY, COMPLEX: ICD-10-PCS | Mod: 51,S$GLB,, | Performed by: OBSTETRICS & GYNECOLOGY

## 2023-06-21 PROCEDURE — 51784 PR ANAL/URINARY MUSCLE STUDY: ICD-10-PCS | Mod: 51,S$GLB,, | Performed by: OBSTETRICS & GYNECOLOGY

## 2023-06-21 PROCEDURE — 51728 PR COMPLEX CYSTOMETROGRAM VOIDING PRESSURE STUDIES: ICD-10-PCS | Mod: S$GLB,,, | Performed by: OBSTETRICS & GYNECOLOGY

## 2023-06-21 PROCEDURE — 51784 ANAL/URINARY MUSCLE STUDY: CPT | Mod: 51,S$GLB,, | Performed by: OBSTETRICS & GYNECOLOGY

## 2023-06-21 PROCEDURE — 51741 ELECTRO-UROFLOWMETRY FIRST: CPT | Mod: 51,S$GLB,, | Performed by: OBSTETRICS & GYNECOLOGY

## 2023-06-21 PROCEDURE — 51797 INTRAABDOMINAL PRESSURE TEST: CPT | Mod: S$GLB,,, | Performed by: OBSTETRICS & GYNECOLOGY

## 2023-06-21 RX ORDER — NITROFURANTOIN MACROCRYSTALS 50 MG/1
100 CAPSULE ORAL 2 TIMES DAILY
Qty: 12 CAPSULE | Refills: 0 | Status: SHIPPED | OUTPATIENT
Start: 2023-06-21 | End: 2023-06-24

## 2023-06-21 NOTE — PROGRESS NOTES
SURGEONS NARRATIVE:  A time out was performed in which the patient identity and procedure were confirmed.  Urodynamic evaluation was performed using a computerized system ([employer]  Uroflowmetry was performed on the patient in the sitting position without catheters in place.  Subsequent urodynamic testing was performed with the patient in the lithotomy position at 45 degrees. Air charged catheters were used with sterile water as the infusion medium. Vesical and abdominal (rectal) pressures were measured, and detrusor pressure was calculated. EMG activity was recorded with surface electrodes. During filling, room temperature sterile water was infused at a rate of 30 cubic centimeters per minute. The patient was asked cough after instillation of each 100cc volume. Two Valsalva leak point pressures and two cough leak point pressures were performed with the catheters in place at 300 cubic centimeters and again at maximum capacity. Valsalva leak point pressure was defined as the difference between vesical pressure at which leakage was noted (visualized at the external urethral meatus) and the baseline vesical pressure. Following urodynamic testing, a pressure flow study was performed with the patient in the sitting position. Vesical and abdominal pressures were monitored and detrusor pressures were calculated. After the pressure flow study, the catheters were then removed. The patient tolerated the procedure well.     Urine dipstick: neg.    1.  VOIDING PHASE:      a.  Uroflowmetry:  Prolapse reduction: No  Voided volume:  20 mL   Voiding time:   9.0 seconds  Max flow:  4.5 mL/s  Avg flow:   2.1 mL/s   PVR:   10 mL    The overall configuration of this uroflow study was  very small amount can not really be delineated.      b.  Pressure flow:  Prolapse reduction:   Voided volume:    mL  Voiding time:    seconds  Peak flow:   mL/s   Avg flow:   mL/s  Max det pressure:    cm H20  Det pressure at max  flow:  cm H20  Void initiated by  .    Urethral relaxation (EMG):  .    PVR (calculated):   mL    The overall configuration of this pressure flow study was patient is very uncomfortable with the catheters she was unable delineate void with catheter.      2.  FILLING PHASE:  1st desire: 50 mL  Normal desire:  89.9 mL  Strong desire:   mL  Urgency:  264 mL  Compliance (calculated)  compliant mL/cm H20  EMG activity during filling:   appropriate  Detrusor contractions observed: No.  While we do not see discrete spontaneous detrusor decompression I do see from patient's reaction I do believe that there was continence bladder jb but there was no leakage most likely secondary to the catheter being in place    3.  URETHRAL FUNCTION/STORAGE PHASE:    a.  WITHOUT prolapse reduction:  We have 2 different leak points and there about 90 and 63    These findings are consistent with urodynamic stress incontinence     Assessment:  Long discussion with patient regarding my findings this is a mixed incontinence element for that reason were going to do a mid urethral sling retropubic which I discussed her in great length and were going to utilize medication I am going to start number bed trick at 25 mg the          atient is a candidate for  U.S. retropubic this is a mixed incontinence picture  Plan:

## 2023-06-28 ENCOUNTER — TELEPHONE (OUTPATIENT)
Dept: UROGYNECOLOGY | Facility: CLINIC | Age: 53
End: 2023-06-28
Payer: COMMERCIAL

## 2023-06-28 NOTE — TELEPHONE ENCOUNTER
----- Message from Mercedes Baker LPN sent at 6/26/2023 10:34 AM CDT -----  Regarding: needs mid urethral sling  MD Mercedes Reveles LPN  Caller: Unspecified (5 days ago, 10:41 AM)  Let set this nice patient up [facility] as possible thank you so much this is a patient that we have already done the sacral colpopexy on so the sooner the better thank you

## 2023-07-13 ENCOUNTER — OFFICE VISIT (OUTPATIENT)
Dept: UROGYNECOLOGY | Facility: CLINIC | Age: 53
End: 2023-07-13
Payer: COMMERCIAL

## 2023-07-13 VITALS
HEIGHT: 67 IN | DIASTOLIC BLOOD PRESSURE: 62 MMHG | BODY MASS INDEX: 19.14 KG/M2 | SYSTOLIC BLOOD PRESSURE: 139 MMHG | WEIGHT: 121.94 LBS | HEART RATE: 65 BPM

## 2023-07-13 DIAGNOSIS — N39.3 SUI (STRESS URINARY INCONTINENCE, FEMALE): Primary | ICD-10-CM

## 2023-07-13 PROCEDURE — 99213 PR OFFICE/OUTPT VISIT, EST, LEVL III, 20-29 MIN: ICD-10-PCS | Mod: S$GLB,,, | Performed by: OBSTETRICS & GYNECOLOGY

## 2023-07-13 PROCEDURE — 99213 OFFICE O/P EST LOW 20 MIN: CPT | Mod: S$GLB,,, | Performed by: OBSTETRICS & GYNECOLOGY

## 2023-07-13 PROCEDURE — 99999 PR PBB SHADOW E&M-EST. PATIENT-LVL IV: ICD-10-PCS | Mod: PBBFAC,,, | Performed by: OBSTETRICS & GYNECOLOGY

## 2023-07-13 PROCEDURE — 99999 PR PBB SHADOW E&M-EST. PATIENT-LVL IV: CPT | Mod: PBBFAC,,, | Performed by: OBSTETRICS & GYNECOLOGY

## 2023-07-13 NOTE — PROGRESS NOTES
Greater than 6 weeks postop from robotic reconstruction indication was advanced pelvic organ prolapse stage III anterior compartment predominant.    Since then patient has been experiencing leakage of urine and she is frustrated by this.                        Past Medical History:   Diagnosis Date    ADD (attention deficit disorder)      Anxiety      Depression      Fibromyalgia      Prediabetes      Scoliosis      Ulcerative colitis                 Past Surgical History:   Procedure Laterality Date    AUGMENTATION OF BREAST        BREAST SURGERY         implants and lift    CYSTOSCOPY N/A 2023     Procedure: CYSTOSCOPY;  Surgeon: Jeff Briseno MD;  Location: [facility] OR;  Service: OB/GYN;  Laterality: N/A;    NASAL SEPTUM SURGERY        ROBOT-ASSISTED LAPAROSCOPIC ABDOMINAL SACROCOLPOPEXY USING DA RICHIE XI N/A 2023     Procedure: XI ROBOTIC SACROCOLPOPEXY, ABDOMINAL;  Surgeon: Jeff Briseno MD;  Location: [facility] OR;  Service: OB/GYN;  Laterality: N/A;    TONSILLECTOMY        XI ROBOTIC HYSTERECTOMY, WITH SALPINGO-OOPHORECTOMY Bilateral 2023     Procedure: XI ROBOTIC HYSTERECTOMY,WITH SALPINGO-OOPHORECTOMY;  Surgeon: Jeff Briseno MD;  Location: [facility] OR;  Service: OB/GYN;  Laterality: Bilateral;         GYN & OB History  Patient's last menstrual period was 2014.   Date of Last Pap: No result found                      OB History    Para Term  AB Living   3 3 3         SAB IAB Ectopic Multiple Live Births                         # Outcome Date GA Lbr Marco Antonio/2nd Weight Sex Delivery Anes PTL Lv   3 Term                     2 Term                     1 Term                           Health Maintenance           Date Due Completion Date     COVID-19 Vaccine (1) Never done ---     Pneumococcal Vaccines (Age 0-64) (1 - PCV) Never done ---     TETANUS VACCINE Never done ---     Colorectal Cancer Screening Never done ---     Shingles Vaccine (1 of 2) Never done ---     Lipid Panel 2019  "5/9/2014     Influenza Vaccine (Season Ended) 09/01/2023 11/22/2020     Mammogram 02/22/2024 2/22/2023                      Family History   Problem Relation Age of Onset    Ephraim syndrome Son      Thyroid disease Maternal Aunt      Breast cancer Maternal Aunt      Cancer Maternal Aunt 45         breast cancer    Lupus Neg Hx      Psoriasis Neg Hx      Rheum arthritis Neg Hx           Social History            Socioeconomic History    Marital status:    Tobacco Use    Smoking status: Some Days       Types: Cigarettes, Vaping with nicotine    Smokeless tobacco: Never    Tobacco comments:       24 year old son with Jamaal's disease which causes mental retardation; she is ; 3 children total   Substance and Sexual Activity    Alcohol use: Yes       Comment: Couple drinks every 2 weeks    Drug use: No    Sexual activity: Yes       Partners: Male         Review of Systems  Review of Systems  Leakage of urine     Objective:   /80   Pulse 100   Ht 5' 7" (1.702 m)   Wt 57.7 kg (127 lb 3.3 oz)   LMP 08/18/2014   BMI 19.92 kg/m²      Physical Exam   Deferred  Assessment:      1. Mixed incontinence urge and stress (male)(female)             Plan:      1. Mixed incontinence urge and stress (male)(female)       I am applying mixed urinary incontinence at this time there is GIACOMO elements this is not obvious last visit were was I thought OAB urge predominant patient states gem Celia did not work we were able to procure a 50 mg sample of her bed trick she will stop this 3 days before I carry out urodynamics next available which I believe this May 25th all questions that were asked were addressed patient appreciated the time and jesus discussion all this was discussed patient before which she agrees.  There are no Patient Instructions on file for this visit.       Review of all aspects were going to move forward with mid urethral sling retropubic next week  Urine dipstick: neg.     1.  VOIDING PHASE:     "   a.  Uroflowmetry:  Prolapse reduction: No  Voided volume:  20 mL   Voiding time:   9.0 seconds  Max flow:  4.5 mL/s  Avg flow:   2.1 mL/s   PVR:   10 mL     The overall configuration of this uroflow study was  very small amount can not really be delineated.       b.  Pressure flow:  Prolapse reduction:   Voided volume:    mL  Voiding time:    seconds  Peak flow:   mL/s   Avg flow:   mL/s  Max det pressure:    cm H20  Det pressure at max flow:  cm H20  Void initiated by  .    Urethral relaxation (EMG):  .    PVR (calculated):   mL     The overall configuration of this pressure flow study was patient is very uncomfortable with the catheters she was unable delineate void with catheter.       2.  FILLING PHASE:  1st desire: 50 mL  Normal desire:  89.9 mL  Strong desire:   mL  Urgency:  264 mL  Compliance (calculated)  compliant mL/cm H20  EMG activity during filling:   appropriate  Detrusor contractions observed: No.  While we do not see discrete spontaneous detrusor decompression I do see from patient's reaction I do believe that there was continence bladder jb but there was no leakage most likely secondary to the catheter being in place     3.  URETHRAL FUNCTION/STORAGE PHASE:     a.  WITHOUT prolapse reduction:  We have 2 different leak points and there about 90 and 63     These findings are consistent with urodynamic stress incontinence      Assessment:  Long discussion with patient regarding my findings this is a mixed incontinence element for that reason were going to do a mid urethral sling retropubic which I discussed her in great length and were going to utilize medication I am going to start number bed trick at 25 mg the              atient is a candidate for  U.S. retropubic this is a mixed incontinence picture

## 2023-07-13 NOTE — H&P (VIEW-ONLY)
Greater than 6 weeks postop from robotic reconstruction indication was advanced pelvic organ prolapse stage III anterior compartment predominant.    Since then patient has been experiencing leakage of urine and she is frustrated by this.                        Past Medical History:   Diagnosis Date    ADD (attention deficit disorder)      Anxiety      Depression      Fibromyalgia      Prediabetes      Scoliosis      Ulcerative colitis                 Past Surgical History:   Procedure Laterality Date    AUGMENTATION OF BREAST        BREAST SURGERY         implants and lift    CYSTOSCOPY N/A 2023     Procedure: CYSTOSCOPY;  Surgeon: Jeff Briseno MD;  Location: [facility] OR;  Service: OB/GYN;  Laterality: N/A;    NASAL SEPTUM SURGERY        ROBOT-ASSISTED LAPAROSCOPIC ABDOMINAL SACROCOLPOPEXY USING DA RICHIE XI N/A 2023     Procedure: XI ROBOTIC SACROCOLPOPEXY, ABDOMINAL;  Surgeon: Jeff Briseno MD;  Location: [facility] OR;  Service: OB/GYN;  Laterality: N/A;    TONSILLECTOMY        XI ROBOTIC HYSTERECTOMY, WITH SALPINGO-OOPHORECTOMY Bilateral 2023     Procedure: XI ROBOTIC HYSTERECTOMY,WITH SALPINGO-OOPHORECTOMY;  Surgeon: Jeff Briseno MD;  Location: [facility] OR;  Service: OB/GYN;  Laterality: Bilateral;         GYN & OB History  Patient's last menstrual period was 2014.   Date of Last Pap: No result found                      OB History    Para Term  AB Living   3 3 3         SAB IAB Ectopic Multiple Live Births                         # Outcome Date GA Lbr Marco Antonio/2nd Weight Sex Delivery Anes PTL Lv   3 Term                     2 Term                     1 Term                           Health Maintenance           Date Due Completion Date     COVID-19 Vaccine (1) Never done ---     Pneumococcal Vaccines (Age 0-64) (1 - PCV) Never done ---     TETANUS VACCINE Never done ---     Colorectal Cancer Screening Never done ---     Shingles Vaccine (1 of 2) Never done ---     Lipid Panel 2019  "5/9/2014     Influenza Vaccine (Season Ended) 09/01/2023 11/22/2020     Mammogram 02/22/2024 2/22/2023                      Family History   Problem Relation Age of Onset    Ephraim syndrome Son      Thyroid disease Maternal Aunt      Breast cancer Maternal Aunt      Cancer Maternal Aunt 45         breast cancer    Lupus Neg Hx      Psoriasis Neg Hx      Rheum arthritis Neg Hx           Social History            Socioeconomic History    Marital status:    Tobacco Use    Smoking status: Some Days       Types: Cigarettes, Vaping with nicotine    Smokeless tobacco: Never    Tobacco comments:       24 year old son with Jamaal's disease which causes mental retardation; she is ; 3 children total   Substance and Sexual Activity    Alcohol use: Yes       Comment: Couple drinks every 2 weeks    Drug use: No    Sexual activity: Yes       Partners: Male         Review of Systems  Review of Systems  Leakage of urine     Objective:   /80   Pulse 100   Ht 5' 7" (1.702 m)   Wt 57.7 kg (127 lb 3.3 oz)   LMP 08/18/2014   BMI 19.92 kg/m²      Physical Exam   Deferred  Assessment:      1. Mixed incontinence urge and stress (male)(female)             Plan:      1. Mixed incontinence urge and stress (male)(female)       I am applying mixed urinary incontinence at this time there is GIACOMO elements this is not obvious last visit were was I thought OAB urge predominant patient states gem Celia did not work we were able to procure a 50 mg sample of her bed trick she will stop this 3 days before I carry out urodynamics next available which I believe this May 25th all questions that were asked were addressed patient appreciated the time and jesus discussion all this was discussed patient before which she agrees.  There are no Patient Instructions on file for this visit.       Review of all aspects were going to move forward with mid urethral sling retropubic next week  Urine dipstick: neg.     1.  VOIDING PHASE:     "   a.  Uroflowmetry:  Prolapse reduction: No  Voided volume:  20 mL   Voiding time:   9.0 seconds  Max flow:  4.5 mL/s  Avg flow:   2.1 mL/s   PVR:   10 mL     The overall configuration of this uroflow study was  very small amount can not really be delineated.       b.  Pressure flow:  Prolapse reduction:   Voided volume:    mL  Voiding time:    seconds  Peak flow:   mL/s   Avg flow:   mL/s  Max det pressure:    cm H20  Det pressure at max flow:  cm H20  Void initiated by  .    Urethral relaxation (EMG):  .    PVR (calculated):   mL     The overall configuration of this pressure flow study was patient is very uncomfortable with the catheters she was unable delineate void with catheter.       2.  FILLING PHASE:  1st desire: 50 mL  Normal desire:  89.9 mL  Strong desire:   mL  Urgency:  264 mL  Compliance (calculated)  compliant mL/cm H20  EMG activity during filling:   appropriate  Detrusor contractions observed: No.  While we do not see discrete spontaneous detrusor decompression I do see from patient's reaction I do believe that there was continence bladder jb but there was no leakage most likely secondary to the catheter being in place     3.  URETHRAL FUNCTION/STORAGE PHASE:     a.  WITHOUT prolapse reduction:  We have 2 different leak points and there about 90 and 63     These findings are consistent with urodynamic stress incontinence      Assessment:  Long discussion with patient regarding my findings this is a mixed incontinence element for that reason were going to do a mid urethral sling retropubic which I discussed her in great length and were going to utilize medication I am going to start number bed trick at 25 mg the              atient is a candidate for  U.S. retropubic this is a mixed incontinence picture

## 2023-07-17 DIAGNOSIS — N39.3 SUI (STRESS URINARY INCONTINENCE, FEMALE): Primary | ICD-10-CM

## 2023-07-19 ENCOUNTER — ANESTHESIA EVENT (OUTPATIENT)
Dept: SURGERY | Facility: HOSPITAL | Age: 53
End: 2023-07-19
Payer: COMMERCIAL

## 2023-07-20 ENCOUNTER — ANESTHESIA (OUTPATIENT)
Dept: SURGERY | Facility: HOSPITAL | Age: 53
End: 2023-07-20
Payer: COMMERCIAL

## 2023-07-20 ENCOUNTER — HOSPITAL ENCOUNTER (OUTPATIENT)
Facility: HOSPITAL | Age: 53
Discharge: HOME | End: 2023-07-20
Attending: OBSTETRICS & GYNECOLOGY | Admitting: OBSTETRICS & GYNECOLOGY
Payer: COMMERCIAL

## 2023-07-20 DIAGNOSIS — Z01.818 PREOP TESTING: Primary | ICD-10-CM

## 2023-07-20 DIAGNOSIS — N39.3 SUI (STRESS URINARY INCONTINENCE, FEMALE): ICD-10-CM

## 2023-07-20 LAB
ANION GAP SERPL CALC-SCNC: 10 MMOL/L (ref 8–16)
BUN SERPL-MCNC: 13 MG/DL (ref 6–20)
CALCIUM SERPL-MCNC: 9 MG/DL (ref 8.7–10.5)
CHLORIDE SERPL-SCNC: 103 MMOL/L (ref 95–110)
CO2 SERPL-SCNC: 26 MMOL/L (ref 23–29)
CREAT SERPL-MCNC: 0.7 MG/DL (ref 0.5–1.4)
EST. GFR  (NO RACE VARIABLE): >60 ML/MIN/1.73 M^2
GLUCOSE SERPL-MCNC: 100 MG/DL (ref 70–110)
HCT VFR BLD AUTO: 43.4 % (ref 37–48.5)
HGB BLD-MCNC: 14.5 G/DL (ref 12–16)
POTASSIUM SERPL-SCNC: 4.4 MMOL/L (ref 3.5–5.1)
SODIUM SERPL-SCNC: 139 MMOL/L (ref 136–145)

## 2023-07-20 PROCEDURE — 63600175 PHARM REV CODE 636 W HCPCS: Performed by: NURSE ANESTHETIST, CERTIFIED REGISTERED

## 2023-07-20 PROCEDURE — 37000009 HC ANESTHESIA EA ADD 15 MINS: Performed by: OBSTETRICS & GYNECOLOGY

## 2023-07-20 PROCEDURE — 57288 PR SLING OPER STRES INCONTINENCE: ICD-10-PCS | Mod: ,,, | Performed by: OBSTETRICS & GYNECOLOGY

## 2023-07-20 PROCEDURE — 25000003 PHARM REV CODE 250: Performed by: ANESTHESIOLOGY

## 2023-07-20 PROCEDURE — D9220A PRA ANESTHESIA: Mod: ANES,,, | Performed by: ANESTHESIOLOGY

## 2023-07-20 PROCEDURE — 36415 COLL VENOUS BLD VENIPUNCTURE: CPT | Performed by: ANESTHESIOLOGY

## 2023-07-20 PROCEDURE — 63600175 PHARM REV CODE 636 W HCPCS: Performed by: OBSTETRICS & GYNECOLOGY

## 2023-07-20 PROCEDURE — C1771 REP DEV, URINARY, W/SLING: HCPCS | Performed by: OBSTETRICS & GYNECOLOGY

## 2023-07-20 PROCEDURE — 71000015 HC POSTOP RECOV 1ST HR: Performed by: OBSTETRICS & GYNECOLOGY

## 2023-07-20 PROCEDURE — 71000016 HC POSTOP RECOV ADDL HR: Performed by: OBSTETRICS & GYNECOLOGY

## 2023-07-20 PROCEDURE — 25000003 PHARM REV CODE 250: Performed by: OBSTETRICS & GYNECOLOGY

## 2023-07-20 PROCEDURE — 85018 HEMOGLOBIN: CPT | Performed by: ANESTHESIOLOGY

## 2023-07-20 PROCEDURE — 27200651 HC AIRWAY, LMA: Performed by: NURSE ANESTHETIST, CERTIFIED REGISTERED

## 2023-07-20 PROCEDURE — 57288 REPAIR BLADDER DEFECT: CPT | Mod: ,,, | Performed by: OBSTETRICS & GYNECOLOGY

## 2023-07-20 PROCEDURE — 85014 HEMATOCRIT: CPT | Performed by: ANESTHESIOLOGY

## 2023-07-20 PROCEDURE — 71000033 HC RECOVERY, INTIAL HOUR: Performed by: OBSTETRICS & GYNECOLOGY

## 2023-07-20 PROCEDURE — 27201423 OPTIME MED/SURG SUP & DEVICES STERILE SUPPLY: Performed by: OBSTETRICS & GYNECOLOGY

## 2023-07-20 PROCEDURE — 80048 BASIC METABOLIC PNL TOTAL CA: CPT | Performed by: ANESTHESIOLOGY

## 2023-07-20 PROCEDURE — 37000008 HC ANESTHESIA 1ST 15 MINUTES: Performed by: OBSTETRICS & GYNECOLOGY

## 2023-07-20 PROCEDURE — D9220A PRA ANESTHESIA: ICD-10-PCS | Mod: CRNA,,, | Performed by: NURSE ANESTHETIST, CERTIFIED REGISTERED

## 2023-07-20 PROCEDURE — 63600175 PHARM REV CODE 636 W HCPCS: Performed by: ANESTHESIOLOGY

## 2023-07-20 PROCEDURE — 71000039 HC RECOVERY, EACH ADD'L HOUR: Performed by: OBSTETRICS & GYNECOLOGY

## 2023-07-20 PROCEDURE — 25000003 PHARM REV CODE 250: Performed by: NURSE ANESTHETIST, CERTIFIED REGISTERED

## 2023-07-20 PROCEDURE — 36000707: Performed by: OBSTETRICS & GYNECOLOGY

## 2023-07-20 PROCEDURE — D9220A PRA ANESTHESIA: ICD-10-PCS | Mod: ANES,,, | Performed by: ANESTHESIOLOGY

## 2023-07-20 PROCEDURE — D9220A PRA ANESTHESIA: Mod: CRNA,,, | Performed by: NURSE ANESTHETIST, CERTIFIED REGISTERED

## 2023-07-20 PROCEDURE — 36000706: Performed by: OBSTETRICS & GYNECOLOGY

## 2023-07-20 DEVICE — SLING FIT ADVANTAGE: Type: IMPLANTABLE DEVICE | Site: VAGINA | Status: FUNCTIONAL

## 2023-07-20 RX ORDER — LIDOCAINE HYDROCHLORIDE 20 MG/ML
INJECTION INTRAVENOUS
Status: DISCONTINUED | OUTPATIENT
Start: 2023-07-20 | End: 2023-07-20

## 2023-07-20 RX ORDER — FENTANYL CITRATE 50 UG/ML
25 INJECTION, SOLUTION INTRAMUSCULAR; INTRAVENOUS EVERY 5 MIN PRN
Status: ACTIVE | OUTPATIENT
Start: 2023-07-20

## 2023-07-20 RX ORDER — OXYCODONE AND ACETAMINOPHEN 7.5; 325 MG/1; MG/1
1 TABLET ORAL EVERY 4 HOURS PRN
Qty: 21 TABLET | Refills: 0 | Status: SHIPPED | OUTPATIENT
Start: 2023-07-20 | End: 2023-07-27

## 2023-07-20 RX ORDER — ACETAMINOPHEN 10 MG/ML
INJECTION, SOLUTION INTRAVENOUS
Status: DISCONTINUED | OUTPATIENT
Start: 2023-07-20 | End: 2023-07-20

## 2023-07-20 RX ORDER — HYDROMORPHONE HYDROCHLORIDE 2 MG/ML
0.2 INJECTION, SOLUTION INTRAMUSCULAR; INTRAVENOUS; SUBCUTANEOUS EVERY 5 MIN PRN
Status: ACTIVE | OUTPATIENT
Start: 2023-07-20

## 2023-07-20 RX ORDER — LIDOCAINE HYDROCHLORIDE 20 MG/ML
JELLY TOPICAL
Status: DISCONTINUED | OUTPATIENT
Start: 2023-07-20 | End: 2023-07-20 | Stop reason: HOSPADM

## 2023-07-20 RX ORDER — LIDOCAINE HYDROCHLORIDE 10 MG/ML
1 INJECTION, SOLUTION EPIDURAL; INFILTRATION; INTRACAUDAL; PERINEURAL ONCE
Status: DISPENSED | OUTPATIENT
Start: 2023-07-20

## 2023-07-20 RX ORDER — DEXAMETHASONE SODIUM PHOSPHATE 4 MG/ML
INJECTION, SOLUTION INTRA-ARTICULAR; INTRALESIONAL; INTRAMUSCULAR; INTRAVENOUS; SOFT TISSUE
Status: DISCONTINUED | OUTPATIENT
Start: 2023-07-20 | End: 2023-07-20

## 2023-07-20 RX ORDER — PHENYLEPHRINE HYDROCHLORIDE 10 MG/ML
INJECTION INTRAVENOUS
Status: DISCONTINUED | OUTPATIENT
Start: 2023-07-20 | End: 2023-07-20

## 2023-07-20 RX ORDER — CEFAZOLIN SODIUM 2 G/50ML
2 SOLUTION INTRAVENOUS
Status: COMPLETED | OUTPATIENT
Start: 2023-07-20 | End: 2023-07-20

## 2023-07-20 RX ORDER — MEPERIDINE HYDROCHLORIDE 50 MG/ML
12.5 INJECTION INTRAMUSCULAR; INTRAVENOUS; SUBCUTANEOUS ONCE
Status: COMPLETED | OUTPATIENT
Start: 2023-07-20 | End: 2023-07-20

## 2023-07-20 RX ORDER — PROPOFOL 10 MG/ML
VIAL (ML) INTRAVENOUS
Status: DISCONTINUED | OUTPATIENT
Start: 2023-07-20 | End: 2023-07-20

## 2023-07-20 RX ORDER — FENTANYL CITRATE 50 UG/ML
INJECTION, SOLUTION INTRAMUSCULAR; INTRAVENOUS
Status: DISCONTINUED | OUTPATIENT
Start: 2023-07-20 | End: 2023-07-20

## 2023-07-20 RX ORDER — MIDAZOLAM HYDROCHLORIDE 1 MG/ML
INJECTION INTRAMUSCULAR; INTRAVENOUS
Status: DISCONTINUED | OUTPATIENT
Start: 2023-07-20 | End: 2023-07-20

## 2023-07-20 RX ORDER — PROMETHAZINE HYDROCHLORIDE 25 MG/ML
INJECTION, SOLUTION INTRAMUSCULAR; INTRAVENOUS
Status: DISCONTINUED | OUTPATIENT
Start: 2023-07-20 | End: 2023-07-20

## 2023-07-20 RX ORDER — OXYCODONE HYDROCHLORIDE 5 MG/1
5 TABLET ORAL
Status: ACTIVE | OUTPATIENT
Start: 2023-07-20

## 2023-07-20 RX ORDER — ONDANSETRON 2 MG/ML
INJECTION INTRAMUSCULAR; INTRAVENOUS
Status: DISCONTINUED | OUTPATIENT
Start: 2023-07-20 | End: 2023-07-20

## 2023-07-20 RX ADMIN — MIDAZOLAM HYDROCHLORIDE 2 MG: 1 INJECTION INTRAMUSCULAR; INTRAVENOUS at 12:07

## 2023-07-20 RX ADMIN — PHENYLEPHRINE HYDROCHLORIDE 100 MCG: 10 INJECTION INTRAVENOUS at 12:07

## 2023-07-20 RX ADMIN — ONDANSETRON 8 MG: 2 INJECTION INTRAMUSCULAR; INTRAVENOUS at 12:07

## 2023-07-20 RX ADMIN — ACETAMINOPHEN 780 MG: 10 INJECTION, SOLUTION INTRAVENOUS at 12:07

## 2023-07-20 RX ADMIN — DEXAMETHASONE SODIUM PHOSPHATE 4 MG: 4 INJECTION, SOLUTION INTRAMUSCULAR; INTRAVENOUS at 12:07

## 2023-07-20 RX ADMIN — CEFAZOLIN SODIUM 2 G: 2 SOLUTION INTRAVENOUS at 12:07

## 2023-07-20 RX ADMIN — CONJUGATED ESTROGENS 0.5 G: 0.62 CREAM VAGINAL at 12:07

## 2023-07-20 RX ADMIN — PROMETHAZINE HYDROCHLORIDE 6.25 MG: 25 INJECTION INTRAMUSCULAR; INTRAVENOUS at 12:07

## 2023-07-20 RX ADMIN — LIDOCAINE HYDROCHLORIDE 50 MG: 20 INJECTION, SOLUTION INTRAVENOUS at 12:07

## 2023-07-20 RX ADMIN — MEPERIDINE HYDROCHLORIDE 12.5 MG: 50 INJECTION INTRAMUSCULAR; INTRAVENOUS; SUBCUTANEOUS at 01:07

## 2023-07-20 RX ADMIN — SODIUM CHLORIDE, SODIUM GLUCONATE, SODIUM ACETATE, POTASSIUM CHLORIDE, MAGNESIUM CHLORIDE, SODIUM PHOSPHATE, DIBASIC, AND POTASSIUM PHOSPHATE: .53; .5; .37; .037; .03; .012; .00082 INJECTION, SOLUTION INTRAVENOUS at 11:07

## 2023-07-20 RX ADMIN — FENTANYL CITRATE 50 MCG: 50 INJECTION, SOLUTION INTRAMUSCULAR; INTRAVENOUS at 12:07

## 2023-07-20 RX ADMIN — PROPOFOL 120 MG: 10 INJECTION, EMULSION INTRAVENOUS at 12:07

## 2023-07-20 NOTE — PLAN OF CARE
1508- Urine drained from bladder via subramanian. Drainage tube disconnected and 250ml sterile water instilled by gravity using a 60ml syringe.     1515- Pt amb without incident with stand by assist to bathroom. Void yielded 350ml yellow urine.     1520- Dr. Briseno notified by phone. Confirmed ok to discharge.    1525- Packing removed without difficulty, min bleeding noted. The end of the packing gauze with attached to the dermabond. I trimmed the gauze about 1/4 inch from the end and discarded. Pt tolerated voiding trial and packing removal well.     1542- Meets criteria for discharge. Discharged to home with son. Denies nausea. Tolerating fluids. Denies pain. Steady gait. Voiding without difficulty. Discharge instructions reviewed and printed handout given. Verbalized understanding.

## 2023-07-20 NOTE — ANESTHESIA PREPROCEDURE EVALUATION
07/20/2023  Glenis Ruggiero is a 52 y.o., female.      Pre-op Assessment    I have reviewed the Patient Summary Reports.     I have reviewed the Nursing Notes. I have reviewed the NPO Status.   I have reviewed the Medications.     Review of Systems  Anesthesia Hx:  No problems with previous Anesthesia    Social:  Smoker    Cardiovascular:   Hypertension    Renal/:   Stress and urge incontinence    Hepatic/GI:   PUD,    Neurological:   Neuromuscular Disease,    Psych:   Psychiatric History anxiety depression          Physical Exam  General: Well nourished, Cooperative, Alert and Oriented    Airway:  Mallampati: II   Mouth Opening: Normal  TM Distance: Normal  Tongue: Normal  Neck ROM: Normal ROM    Dental:  Intact    Chest/Lungs:  Normal Respiratory Rate    Heart:  Rate: Normal        Anesthesia Plan  Type of Anesthesia, risks & benefits discussed:    Anesthesia Type: Gen ETT  Intra-op Monitoring Plan: Standard ASA Monitors  Post Op Pain Control Plan: multimodal analgesia and IV/PO Opioids PRN  Induction:  IV  Airway Plan: , Post-Induction  Informed Consent: Informed consent signed with the Patient and all parties understand the risks and agree with anesthesia plan.  All questions answered.   ASA Score: 2    Ready For Surgery From Anesthesia Perspective.     .

## 2023-07-20 NOTE — PLAN OF CARE
Patients states she is unable to remove ring-pt educated on risks-rings taped.pt states upper right veneer missing.procedure verified with pt

## 2023-07-20 NOTE — ANESTHESIA PROCEDURE NOTES
Intubation    Date/Time: 7/20/2023 12:19 PM  Performed by: Winter Wilson CRNA  Authorized by: Winter Wilson CRNA     Intubation:     Induction:  Intravenous    Intubated:  Postinduction    Mask Ventilation:  Not attempted    Attempts:  1    Attempted By:  CRNA    Difficult Airway Encountered?: No      Complications:  None    Airway Device:  Supraglottic airway/LMA    Airway Device Size:  3.0    Style/Cuff Inflation:  Cuffed (inflated to minimal occlusive pressure)    Secured at:  The lips    Placement Verified By:  Capnometry    Complicating Factors:  None    Findings Post-Intubation:  Atraumatic/condition of teeth unchanged and BS equal bilateral

## 2023-07-20 NOTE — OP NOTE
Operative Note       Surgery Date: 7/20/2023     Surgeon(s) and Role:     * Jeff Briseno MD - Primary    Pre-op Diagnosis:  Mixed incontinence urge and stress (male)(female) [N39.46]    Post-op Diagnosis: Post-Op Diagnosis Codes:     * Mixed incontinence urge and stress (male)(female) [N39.46]    Procedure(s) (LRB):  SURGICAL PROCEDURE, USING TENSION FREE VAGINAL TAPE, FOR STRESS INCONTINENCE (N/A)    Anesthesia: General    Procedure in Detail/Findings:  The patient was identified in the preoperative area where informed consent was confirmed, and she was taken to the operating room where an adequate level of general anesthesia was obtained. The patient was positioned in high lithotomy position with legs in yellow fin stirrups. Care was taken to avoid joint hyperflexion or hyperextension, and all extremity surfaces were carefully padded so as to minimize risk of neurologic injury. Intravenous antibiotics were administered preoperatively. Sequential compression devices were applied to the patient's lower extremities preoperatively VTE prophylaxis. Surgical time-out was performed, where the patient was identified and procedures confirmed. An examination under anesthesia was performed with findings described as above. The patient's abdomen, perineum, and vagina were sterilely prepped and draped. A subramanian catheter was placed in the bladder for drainage.   Exam under anesthesia revealed normal anatomy  We then proceeded with placement of the Advantage Fit midurethral sling. The skin was marked just above the symphysis pubis, 2 cm laterally on either side of the midline indicating the exit sites for the trocars. A lone star retractor with blue stays was used. The Subramanian catheter was palpated at the urethrovesical junction, and 2 Allis clamps were placed at the anterior vaginal wall along the midurethra. The Subramanian catheter was removed from the patient's bladder. The vaginal epithelium between the two Allis clamps was  injected with the 1% lidocaine with epinephrine. Metzenbaum scissors were used to dissect the vaginal epithelium off the underlying pubocervical muscular tissue in the direction of the angle formed between the ischiopubic ramus and the pubic bone bilaterally. The rigid catheter guide was used to deviate the bladder neck and urethra to the patient's left while the right trocar was advanced to the dissected tract in the patient's right side. Once the urogenital membrane was perforated, the trocar and handle were reoriented in the sagittal plane and the tip of the trocar was advanced through the retropubic space in intimate proximity to the pubic bone. The trocar was then advanced through the skin approximately 2 cm to the right of the midline just above the pubic symphysis. The passage of the Advantage Fit trocar was repeated on the patient's left hand side in a similar fashion, using the catheter guide to deviate the bladder neck to the right. At this point, cystourethroscopy was performed. Cystoscopy was negative for injury after infusion of at least 300 mL in the bladder. The ureteral orifices were noted to have good efflux bilaterally. The bladder was then drained. With a #10 Hegar dilator placed between the sling mesh and the urethra, the arms of the sling were elevated above the pubic symphysis and the plastic sheaths were removed from the mesh arms. Excess mesh was trimmed beneath the suprapubic skin. The Hegar dilator ensured that the mesh sling was placed in a tension-free manner. The vaginal mucosa overlying the sling mesh was closed with a several mattress stitches of 2-0 Vicryl with excellent hemostasis noted. The suprapubic incisions were closed with a 4-0 monocryl and sealed with dermabond.  The patient was transferred to recovery in stable condition. The vagina was packed with guaze.    Estimated Blood Loss: * No values recorded between 7/20/2023 12:28 PM and 7/20/2023 12:48 PM *           Specimens  (From admission, onward)      None          Implants:   Implant Name Type Inv. Item Serial No.  Lot No. LRB No. Used Action   SLING FIT ADVANTAGE - [id number]  SLING FIT ADVANTAGE  [employer] [id number] N/A 1 Implanted              Disposition: PACU - hemodynamically stable.           Condition: Good    Attestation:  I was present and scrubbed for the entire procedure.           Discharge Note    Admit Date: 7/20/2023    Attending Physician: Jeff Briseno MD     Discharge Physician: Jeff Briseno MD    Final Diagnosis: Post-Op Diagnosis Codes:     * Mixed incontinence urge and stress (male)(female) [N39.46]    Disposition: Home or Self Care    Patient Instructions:   Current Discharge Medication List        CONTINUE these medications which have NOT CHANGED    Details   budesonide (ENTOCORT EC) 3 mg capsule TAKE 3 CAPSULES BY MOUTH ONCE A DAY      clonazePAM (KLONOPIN) 0.5 MG tablet clonazepam 0.5 mg tablet   TAKE 1 TABLET BY MOUTH EVERY DAY AS NEEDED      estradioL (EVAMIST) 1.53 mg/spray (1.7%) transdermal spray Evamist 1.53 mg/spray (1.7 %) transdermal spray   APPLY ONE SPRAY EVERY DAY BY TOPICAL ROUTE      mirabegron (MYRBETRIQ) 50 mg Tb24 Take 1 tablet (50 mg total) by mouth Daily.  Qty: 30 tablet, Refills: 11    Associated Diagnoses: Urinary frequency      naratriptan (AMERGE) 2.5 MG tablet as needed.      RESTASIS 0.05 % ophthalmic emulsion       testosterone (ANDROGEL) 1 % (50 mg/5 gram) GlPk testosterone 50 mg/5 gram (1 %) transdermal gel   APPLY EXTERNALLY TO SPECIFIC AREA OF SKIN      tramadol (ULTRAM) 50 mg tablet Take 50 mg by mouth 2 (two) times daily as needed.  Refills: 3      TRINTELLIX 10 mg Tab Take 1 tablet by mouth every evening.      fluticasone (FLONASE) 50 mcg/actuation nasal spray 1 spray by Each Nare route once daily.  Qty: 1 Bottle, Refills: 0    Associated Diagnoses: Acute bacterial rhinosinusitis      lactulose (CHRONULAC) 10 gram/15 mL solution Take 30 mLs by  mouth.      lisinopriL 10 MG tablet every evening. prn      minocycline (MINOCIN,DYNACIN) 50 MG Cap Take 1 capsule by mouth 2 (two) times a day. prn      mupirocin (BACTROBAN) 2 % ointment 3 (three) times daily. Apply to affected area      ondansetron (ZOFRAN) 4 MG tablet ondansetron HCl 4 mg tablet   TAKE 1 TABLET BY MOUTH EVERY DAY AS NEEDED             Discharge Procedure Orders (must include Diet, Follow-up, Activity)  No discharge procedures on file.     Discharge Date: No discharge date for patient encounter.

## 2023-07-20 NOTE — DISCHARGE INSTRUCTIONS
"Discharge Instructions: After Your Surgery/Procedure  Youve just had surgery. During surgery you were given medicine called anesthesia to keep you relaxed and free of pain. After surgery you may have some pain or nausea. This is common. Here are some tips for feeling better and getting well after surgery.     Stay on schedule with your medication.   Going home  Your doctor or nurse will show you how to take care of yourself when you go home. He or she will also answer your questions. Have an adult family member or friend drive you home.      For your safety we recommend these precaution for the first 24 hours after your procedure:  Do not drive or use heavy equipment.  Do not make important decisions or sign legal papers.  Do not drink alcohol.  Have someone stay with you, if needed. He or she can watch for problems and help keep you safe.  Your concentration, balance, coordination, and judgement may be impaired for many hours after anesthesia.  Use caution when ambulating or standing up.     You may feel weak and "washed out" after anesthesia and surgery.      Subtle residual effects of general anesthesia or sedation with regional / local anesthesia can last more than 24 hours.  Rest for the remainder of the day or longer if your Doctor/Surgeon has advised you to do so.  Although you may feel normal within the first 24 hours, your reflexes and mental ability may be impaired without you realizing it.  You may feel dizzy, lightheaded or sleepy for 24 hours or longer.      Be sure to go to all follow-up visits with your doctor. And rest after your surgery for as long as your doctor tells you to.  Coping with pain  If you have pain after surgery, pain medicine will help you feel better. Take it as told, before pain becomes severe. Also, ask your doctor or pharmacist about other ways to control pain. This might be with heat, ice, or relaxation. And follow any other instructions your surgeon or nurse gives you.  Tips " for taking pain medicine  To get the best relief possible, remember these points:  Pain medicines can upset your stomach. Taking them with a little food may help.  Most pain relievers taken by mouth need at least 20 to 30 minutes to start to work.  Taking medicine on a schedule can help you remember to take it. Try to time your medicine so that you can take it before starting an activity. This might be before you get dressed, go for a walk, or sit down for dinner.  Constipation is a common side effect of pain medicines. Call your doctor before taking any medicines such as laxatives or stool softeners to help ease constipation. Also ask if you should skip any foods. Drinking lots of fluids and eating foods such as fruits and vegetables that are high in fiber can also help. Remember, do not take laxatives unless your surgeon has prescribed them.  Drinking alcohol and taking pain medicine can cause dizziness and slow your breathing. It can even be deadly. Do not drink alcohol while taking pain medicine.  Pain medicine can make you react more slowly to things. Do not drive or run machinery while taking pain medicine.  Your health care provider may tell you to take acetaminophen to help ease your pain. Ask him or her how much you are supposed to take each day. Acetaminophen or other pain relievers may interact with your prescription medicines or other over-the-counter (OTC) drugs. Some prescription medicines have acetaminophen and other ingredients. Using both prescription and OTC acetaminophen for pain can cause you to overdose. Read the labels on your OTC medicines with care. This will help you to clearly know the list of ingredients, how much to take, and any warnings. It may also help you not take too much acetaminophen. If you have questions or do not understand the information, ask your pharmacist or health care provider to explain it to you before you take the OTC medicine.  Managing nausea  Some people have an  upset stomach after surgery. This is often because of anesthesia, pain, or pain medicine, or the stress of surgery. These tips will help you handle nausea and eat healthy foods as you get better. If you were on a special food plan before surgery, ask your doctor if you should follow it while you get better. These tips may help:  Do not push yourself to eat. Your body will tell you when to eat and how much.  Start off with clear liquids and soup. They are easier to digest.  Next try semi-solid foods, such as mashed potatoes, applesauce, and gelatin, as you feel ready.  Slowly move to solid foods. Dont eat fatty, rich, or spicy foods at first.  Do not force yourself to have 3 large meals a day. Instead eat smaller amounts more often.  Take pain medicines with a small amount of solid food, such as crackers or toast, to avoid nausea.     Call your surgeon if  You still have pain an hour after taking medicine. The medicine may not be strong enough.  You feel too sleepy, dizzy, or groggy. The medicine may be too strong.  You have side effects like nausea, vomiting, or skin changes, such as rash, itching, or hives.       If you have obstructive sleep apnea  You were given anesthesia medicine during surgery to keep you comfortable and free of pain. After surgery, you may have more apnea spells because of this medicine and other medicines you were given. The spells may last longer than usual.   At home:  Keep using the continuous positive airway pressure (CPAP) device when you sleep. Unless your health care provider tells you not to, use it when you sleep, day or night. CPAP is a common device used to treat obstructive sleep apnea.  Talk with your provider before taking any pain medicine, muscle relaxants, or sedatives. Your provider will tell you about the possible dangers of taking these medicines.  © 5288-8528 [employer]. [address] [place] [address], [place] [address]. All rights reserved. This information is  not intended as a substitute for professional medical care. Always follow your healthcare professional's instructions.   Post op instructions for prevention of DVT  What is deep vein thrombosis?  Deep vein thrombosis (DVT) is the medical term for blood clots in the deep veins of the leg.  These blood clots can be dangerous.  A DVT can block a blood vessel and keep blood from getting where it needs to go.  Another problem is that the clot can travel to other parts of the body such as the lungs.  A clot that travels to the lungs is called a pulmonary embolus (PE) and can cause serious problems with breathing which can lead to death.  Am I at risk for DVT/PE?  If you are not very active, you are at risk of DVT.  Anyone confined to bed, sitting for long periods of time, recovering from surgery, etc. increases the risk of DVT.  Other risk factors are cancer diagnosis, certain medications, estrogen replacement in any form,older age, obesity, pregnancy, smoking, history of clotting disorders, and dehydration.  How will I know if I have a DVT?  Swelling in the lower leg  Pain  Warmth, redness, hardness or bulging of the vein  If you have any of these symptoms, call your doctors office right away.  Some people will not have any symptoms until the clot moves to the lungs.  What are the symptoms of a PE?  Panting, shortness of breath, or trouble breathing  Sharp, knife-like chest pain when you breathe  Coughing or coughing up blood  Rapid heartbeat  If you have any of these symptoms or get worse quickly, call 911 for emergency treatment.  How can I prevent a DVT?  Avoid long periods of inactivity and dont cross your legs--get up and walk around every hour or so.  Stay active--walking after surgery is highly encouraged.  This means you should get out of the house and walk in the neighborhood.  Going up and down stairs will not impair healing (unless advised against such activity by your doctor).    Drink plenty of  noncaffeinated, nonalcoholic fluids each day to prevent dehydration.  Wear special support stockings, if they have been advised by your doctor.  If you travel, stop at least once an hour and walk around.  Avoid smoking (assistance with stopping is available through your healthcare provider)  Always notify your doctor if you are not able to follow the post operative instructions that are given to you at the time of discharge.  It may be necessary to prescribe one of the medications available to prevent DVT.     Using an Incentive Spirometer    An incentive spirometer is a device that helps you do deep breathing exercises. These exercises expand your lungs, aid in circulation, and help prevent pneumonia. Deep breathing exercises also help you breathe better and improve the function of your lungs by:  Keeping your lungs clear  Strengthening your breathing muscles  Helping prevent respiratory complications or problems  The incentive spirometer gives you a way to take an active part in recover. A nurse or therapist will teach you breathing exercises. To do these exercises, you will breathe in through your mouth and not your nose. The incentive spirometer only works correctly if you breathe in through your mouth.  Steps to clear lungs  Step 1. Exhale normally. Then, inhale normally.  Relax and breathe out.  Step 2. Place your lips tightly around the mouthpiece.  Make sure the device is upright and not tilted.  Step 3. Inhale as much air as you can through the mouthpiece (don't breath through your nose).  Inhale slowly and deeply.  Hold your breath long enough to keep the balls or disk raised for at least 3 to 5 seconds, or as instructed by your healthcare provider.  Some spirometers have an indicator to let you know that you are breathing in too fast. If the indicator goes off, breathe in more slowly.  Step 4. Repeat the exercise regularly.  Do this exercise every hour while you're awake, or as instructed by your  healthcare provider.  If you were taught deep breathing and coughing exercises, do them regularly as instructed by your healthcare provider.

## 2023-07-20 NOTE — TRANSFER OF CARE
"Anesthesia Transfer of Care Note    Patient: Glenis Ruggiero    Procedure(s) Performed: Procedure(s) (LRB):  SURGICAL PROCEDURE, USING TENSION FREE VAGINAL TAPE, FOR STRESS INCONTINENCE (N/A)    Patient location: PACU    Anesthesia Type: general    Transport from OR: Transported from OR on 2-3 L/min O2 by NC with adequate spontaneous ventilation    Post pain: adequate analgesia    Post assessment: no apparent anesthetic complications    Level of consciousness: responds to stimulation and sedated    Nausea/Vomiting: no nausea/vomiting    Complications: none    Transfer of care protocol was followed      Last vitals:   Visit Vitals  /66 (BP Location: Left arm, Patient Position: Sitting)   Pulse 67   Temp 36.7 °C (98.1 °F) (Skin)   Resp 20   Ht 5' 7" (1.702 m)   Wt 52.2 kg (115 lb)   LMP 08/18/2014   SpO2 98%   Breastfeeding No   BMI 18.01 kg/m²     "

## 2023-07-21 VITALS
SYSTOLIC BLOOD PRESSURE: 120 MMHG | HEIGHT: 67 IN | BODY MASS INDEX: 18.05 KG/M2 | TEMPERATURE: 98 F | WEIGHT: 115 LBS | DIASTOLIC BLOOD PRESSURE: 63 MMHG | HEART RATE: 70 BPM | RESPIRATION RATE: 18 BRPM | OXYGEN SATURATION: 100 %

## 2023-08-02 DIAGNOSIS — K51.50 ULCERATIVE COLITIS, LEFT SIDED, WITHOUT COMPLICATIONS: ICD-10-CM

## 2023-08-02 DIAGNOSIS — K76.9 LIVER LESION: ICD-10-CM

## 2023-08-02 DIAGNOSIS — R68.81 EARLY SATIETY: Primary | ICD-10-CM

## 2023-08-16 ENCOUNTER — OFFICE VISIT (OUTPATIENT)
Dept: UROGYNECOLOGY | Facility: CLINIC | Age: 53
End: 2023-08-16
Payer: COMMERCIAL

## 2023-08-16 VITALS — HEIGHT: 67 IN | WEIGHT: 115.06 LBS | BODY MASS INDEX: 18.06 KG/M2

## 2023-08-16 DIAGNOSIS — Z09 POSTOP CHECK: ICD-10-CM

## 2023-08-16 DIAGNOSIS — R35.0 URINARY FREQUENCY: Primary | ICD-10-CM

## 2023-08-16 PROCEDURE — 99024 PR POST-OP FOLLOW-UP VISIT: ICD-10-PCS | Mod: S$GLB,,, | Performed by: OBSTETRICS & GYNECOLOGY

## 2023-08-16 PROCEDURE — 99999 PR PBB SHADOW E&M-EST. PATIENT-LVL III: CPT | Mod: PBBFAC,,, | Performed by: OBSTETRICS & GYNECOLOGY

## 2023-08-16 PROCEDURE — 51701 PR INSERTION OF NON-INDWELLING BLADDER CATHETERIZATION FOR RESIDUAL UR: ICD-10-PCS | Mod: S$GLB,,, | Performed by: OBSTETRICS & GYNECOLOGY

## 2023-08-16 PROCEDURE — 99024 POSTOP FOLLOW-UP VISIT: CPT | Mod: S$GLB,,, | Performed by: OBSTETRICS & GYNECOLOGY

## 2023-08-16 PROCEDURE — 99999 PR PBB SHADOW E&M-EST. PATIENT-LVL III: ICD-10-PCS | Mod: PBBFAC,,, | Performed by: OBSTETRICS & GYNECOLOGY

## 2023-08-16 PROCEDURE — 51701 INSERT BLADDER CATHETER: CPT | Mod: S$GLB,,, | Performed by: OBSTETRICS & GYNECOLOGY

## 2023-08-16 PROCEDURE — 87086 URINE CULTURE/COLONY COUNT: CPT | Performed by: OBSTETRICS & GYNECOLOGY

## 2023-08-16 NOTE — PROGRESS NOTES
Subjective:      Patient ID: Glenis Ruggiero is a 52 y.o. female.    Chief Complaint:  No chief complaint on file.      History of Present Illness  Patient doing very well no leakage she is much happier            Past Medical History:   Diagnosis Date    ADD (attention deficit disorder)     Anxiety     Depression     Fibromyalgia     Hypertension     Prediabetes     Scoliosis     Ulcerative colitis            GYN & OB History  Patient's last menstrual period was 2014.   Date of Last Pap: No result found    OB History    Para Term  AB Living   3 3 3         SAB IAB Ectopic Multiple Live Births                  # Outcome Date GA Lbr Marco Antonio/2nd Weight Sex Delivery Anes PTL Lv   3 Term            2 Term            1 Term                Health Maintenance         Date Due Completion Date    COVID-19 Vaccine (1) Never done ---    Pneumococcal Vaccines (Age 0-64) (1 - PCV) Never done ---    TETANUS VACCINE Never done ---    Shingles Vaccine (1 of 2) Never done ---    Lipid Panel 2019    Influenza Vaccine (1) 2023    Mammogram 2024    Colorectal Cancer Screening 2033            Family History   Problem Relation Age of Onset    Ephraim syndrome Son     Thyroid disease Maternal Aunt     Breast cancer Maternal Aunt     Cancer Maternal Aunt 45        breast cancer    Lupus Neg Hx     Psoriasis Neg Hx     Rheum arthritis Neg Hx        Social History     Socioeconomic History    Marital status:    Tobacco Use    Smoking status: Some Days     Current packs/day: 0.00     Types: Cigarettes, Vaping with nicotine    Smokeless tobacco: Never    Tobacco comments:     24 year old son with Jamaal's disease which causes mental retardation; she is ; 3 children total   Substance and Sexual Activity    Alcohol use: Yes     Comment: Couple drinks every 2 weeks    Drug use: No    Sexual activity: Yes     Partners: Male       Review of Systems  Review  "of Systems  No leakage     Objective:   Ht 5' 7" (1.702 m)   Wt 52.2 kg (115 lb 1.3 oz)   LMP 08/18/2014   BMI 18.02 kg/m²     Physical Exam   Healing well from M U.S. retropubic    I want to make sure that she is decompressing bladder I went ahead and straight catheter she would voided earlier I did not have the prevoid amount of postvoid mount was only 10 cc slightly hazy I am going to send off for culture  Assessment:     1. Urinary frequency    2. Postop check            Plan:     1. Urinary frequency    2. Postop check      I am going to wait for the results of the culture before giving her antibiotics otherwise patient doing very well she will follow up with me in 2 weeks final postop visit    There are no Patient Instructions on file for this visit.        "

## 2023-08-18 LAB — BACTERIA UR CULT: NO GROWTH

## 2023-08-30 ENCOUNTER — OFFICE VISIT (OUTPATIENT)
Dept: UROGYNECOLOGY | Facility: CLINIC | Age: 53
End: 2023-08-30
Payer: COMMERCIAL

## 2023-08-30 ENCOUNTER — HOSPITAL ENCOUNTER (OUTPATIENT)
Dept: RADIOLOGY | Facility: HOSPITAL | Age: 53
Discharge: HOME | End: 2023-08-30
Attending: INTERNAL MEDICINE
Payer: COMMERCIAL

## 2023-08-30 VITALS — WEIGHT: 115.06 LBS | BODY MASS INDEX: 18.06 KG/M2 | HEIGHT: 67 IN

## 2023-08-30 DIAGNOSIS — K76.9 LIVER LESION: ICD-10-CM

## 2023-08-30 DIAGNOSIS — K51.50 ULCERATIVE COLITIS, LEFT SIDED, WITHOUT COMPLICATIONS: ICD-10-CM

## 2023-08-30 DIAGNOSIS — Z09 POSTOP CHECK: Primary | ICD-10-CM

## 2023-08-30 DIAGNOSIS — R68.81 EARLY SATIETY: ICD-10-CM

## 2023-08-30 PROCEDURE — 99024 PR POST-OP FOLLOW-UP VISIT: ICD-10-PCS | Mod: S$GLB,,, | Performed by: OBSTETRICS & GYNECOLOGY

## 2023-08-30 PROCEDURE — 25500020 PHARM REV CODE 255: Mod: PO | Performed by: INTERNAL MEDICINE

## 2023-08-30 PROCEDURE — 74178 CT ABD&PLV WO CNTR FLWD CNTR: CPT | Mod: TC,PO

## 2023-08-30 PROCEDURE — 99999 PR PBB SHADOW E&M-EST. PATIENT-LVL III: CPT | Mod: PBBFAC,,, | Performed by: OBSTETRICS & GYNECOLOGY

## 2023-08-30 PROCEDURE — 99024 POSTOP FOLLOW-UP VISIT: CPT | Mod: S$GLB,,, | Performed by: OBSTETRICS & GYNECOLOGY

## 2023-08-30 PROCEDURE — 99999 PR PBB SHADOW E&M-EST. PATIENT-LVL III: ICD-10-PCS | Mod: PBBFAC,,, | Performed by: OBSTETRICS & GYNECOLOGY

## 2023-08-30 RX ADMIN — IOHEXOL 100 ML: 350 INJECTION, SOLUTION INTRAVENOUS at 09:08

## 2023-08-30 NOTE — PROGRESS NOTES
"Subjective:      Patient ID: Glenis Ruggiero is a 52 y.o. female.    Chief Complaint:  Post-op Evaluation      History of Present Illness  No leakage            Past Medical History:   Diagnosis Date    ADD (attention deficit disorder)     Anxiety     Depression     Fibromyalgia     Hypertension     Prediabetes     Scoliosis     Ulcerative colitis            GYN & OB History  Patient's last menstrual period was 2014.   Date of Last Pap: No result found    OB History    Para Term  AB Living   3 3 3         SAB IAB Ectopic Multiple Live Births                  # Outcome Date GA Lbr Marco Antonio/2nd Weight Sex Delivery Anes PTL Lv   3 Term            2 Term            1 Term                Health Maintenance         Date Due Completion Date    COVID-19 Vaccine (1) Never done ---    Pneumococcal Vaccines (Age 0-64) (1 - PCV) Never done ---    TETANUS VACCINE Never done ---    Shingles Vaccine (1 of 2) Never done ---    Lipid Panel 2019    Influenza Vaccine (1) 2023    Mammogram 2024    Colorectal Cancer Screening 2033            Family History   Problem Relation Age of Onset    Ephraim syndrome Son     Thyroid disease Maternal Aunt     Breast cancer Maternal Aunt     Cancer Maternal Aunt 45        breast cancer    Lupus Neg Hx     Psoriasis Neg Hx     Rheum arthritis Neg Hx        Social History     Socioeconomic History    Marital status:    Tobacco Use    Smoking status: Some Days     Types: Cigarettes, Vaping with nicotine    Smokeless tobacco: Never    Tobacco comments:     24 year old son with Jamaal's disease which causes mental retardation; she is ; 3 children total   Substance and Sexual Activity    Alcohol use: Yes     Comment: Couple drinks every 2 weeks    Drug use: No    Sexual activity: Yes     Partners: Male       Review of Systems  Review of Systems  No complaints     Objective:   Ht 5' 7" (1.702 m)   Wt 52.2 kg (115 " lb 1.3 oz)   LMP 08/18/2014   BMI 18.02 kg/m²     Physical Exam   Deferred  Assessment:     1. Postop check            Plan:     1. Postop check      We of culture last week  There are no Patient Instructions on file for this visit.

## 2023-11-10 ENCOUNTER — OFFICE VISIT (OUTPATIENT)
Dept: URGENT CARE | Facility: CLINIC | Age: 53
End: 2023-11-10
Payer: COMMERCIAL

## 2023-11-10 VITALS
OXYGEN SATURATION: 100 % | WEIGHT: 117 LBS | BODY MASS INDEX: 18.36 KG/M2 | TEMPERATURE: 98 F | HEIGHT: 67 IN | HEART RATE: 100 BPM | SYSTOLIC BLOOD PRESSURE: 117 MMHG | DIASTOLIC BLOOD PRESSURE: 76 MMHG | RESPIRATION RATE: 20 BRPM

## 2023-11-10 DIAGNOSIS — B35.1 NAIL FUNGUS: Primary | ICD-10-CM

## 2023-11-10 DIAGNOSIS — L60.0 INGROWN TOENAIL WITHOUT INFECTION: ICD-10-CM

## 2023-11-10 PROCEDURE — 99214 PR OFFICE/OUTPT VISIT, EST, LEVL IV, 30-39 MIN: ICD-10-PCS | Mod: S$GLB,,, | Performed by: NURSE PRACTITIONER

## 2023-11-10 PROCEDURE — 99214 OFFICE O/P EST MOD 30 MIN: CPT | Mod: S$GLB,,, | Performed by: NURSE PRACTITIONER

## 2023-11-10 NOTE — PROGRESS NOTES
"Subjective:      Patient ID: Glenis Ruggiero is a 53 y.o. female.    Vitals:  height is 5' 7" (1.702 m) and weight is 53.1 kg (117 lb). Her temperature is 97.9 °F (36.6 °C). Her blood pressure is 117/76 and her pulse is 100. Her respiration is 20 and oxygen saturation is 100%.     Chief Complaint: Hand Pain    Pt also States " Rt Big toe pain"  Reports fungus to right  nail     Hand Pain   The incident occurred more than 1 week ago. The incident occurred at home. The injury mechanism is unknown. The pain is present in the right fingers (Rt Thumb). The pain is moderate. She has tried NSAIDs for the symptoms. The treatment provided mild relief.       Musculoskeletal:  Positive for pain.          Objective:     Physical Exam   HENT:   Head: Normocephalic and atraumatic.   Ears:   Right Ear: External ear normal.   Left Ear: External ear normal.   Pulmonary/Chest: Effort normal.   Abdominal: Normal appearance.   Musculoskeletal:      Right hand: Right thumb: Exhibits tenderness. Comments: She seems to have be digging in cuticles and nail bed   Neurological: no focal deficit. She is alert.   Nursing note and vitals reviewed.      Assessment:     1. Nail fungus    2. Ingrown toenail without infection        Plan:   No visible fungus to right thumb nail  Pt does has visible where she has cut toenails down. No sign of infection or ingrown toenail. States she needs fungus polish. She has appt with Podiatry this month.    Nail fungus  -     Ambulatory referral/consult to Dermatology    Ingrown toenail without infection                Patient Instructions   FOLLOW UP WITH YOUR PCP    General Discharge Instructions   If you were prescribed a narcotic or controlled medication, do not drive or operate heavy equipment or machinery while taking these medications.  If you were prescribed antibiotics, please take them to completion.  You must understand that you've received an Urgent Care treatment only and that you may be released " before all your medical problems are known or treated. You, the patient, will arrange for follow up care as instructed.  Follow up with your PCP or specialty clinic as directed in the next 1-2 weeks if not improved or as needed.  You can call [phone] to schedule an appointment with the appropriate provider.  If your condition worsens we recommend that you receive another evaluation at the emergency room immediately or contact your primary medical clinics after hours call service to discuss your concerns.  Please return here or go to the Emergency Department for any concerns or worsening of condition.      WE CANNOT RULE OUT ALL POSSIBLE CAUSES OF YOUR SYMPTOMS IN THE URGENT CARE SETTING PLEASE GO TO THE ER IF YOU FEELS YOUR CONDITION IS WORSENING OR YOU WOULD LIKE EMERGENT EVALUATION.         Take a small amount of Vicks from the jar with a Q-tip. Always use a clean Q-tip when removing Vicks from the jar to avoid contaminating the Vicks in the jar.  Place a THIN coat of Vicks on top of the nails.  Make sure only the nails are covered with the Vicks. Try to avoid getting the Vicks on the skin or in between the toes as it might irritate the skin.  Apply the Vicks once a day either in the morning or the evening. We recommend wearing socks after you have applied the Vicks.  If applying Vicks in the morning, wash it off before going to bed. If applying Vicks in the evening, wash it off in the morning. Clean thoroughly between toes and dry well.    * Be sure to use separate jar of Vicks for your toes. Label each jar well!    ** Remove nail polish as the Vicks wont be effective through polish.

## 2023-11-10 NOTE — PATIENT INSTRUCTIONS
FOLLOW UP WITH YOUR PCP    General Discharge Instructions   If you were prescribed a narcotic or controlled medication, do not drive or operate heavy equipment or machinery while taking these medications.  If you were prescribed antibiotics, please take them to completion.  You must understand that you've received an Urgent Care treatment only and that you may be released before all your medical problems are known or treated. You, the patient, will arrange for follow up care as instructed.  Follow up with your PCP or specialty clinic as directed in the next 1-2 weeks if not improved or as needed.  You can call [phone] to schedule an appointment with the appropriate provider.  If your condition worsens we recommend that you receive another evaluation at the emergency room immediately or contact your primary medical clinics after hours call service to discuss your concerns.  Please return here or go to the Emergency Department for any concerns or worsening of condition.      WE CANNOT RULE OUT ALL POSSIBLE CAUSES OF YOUR SYMPTOMS IN THE URGENT CARE SETTING PLEASE GO TO THE ER IF YOU FEELS YOUR CONDITION IS WORSENING OR YOU WOULD LIKE EMERGENT EVALUATION.         Take a small amount of Vicks from the jar with a Q-tip. Always use a clean Q-tip when removing Vicks from the jar to avoid contaminating the Vicks in the jar.  Place a THIN coat of Vicks on top of the nails.  Make sure only the nails are covered with the Vicks. Try to avoid getting the Vicks on the skin or in between the toes as it might irritate the skin.  Apply the Vicks once a day either in the morning or the evening. We recommend wearing socks after you have applied the Vicks.  If applying Vicks in the morning, wash it off before going to bed. If applying Vicks in the evening, wash it off in the morning. Clean thoroughly between toes and dry well.    * Be sure to use separate jar of Vicks for your toes. Label each jar well!    ** Remove nail polish  as the Vicks wont be effective through polish.

## 2024-03-22 ENCOUNTER — HOSPITAL ENCOUNTER (EMERGENCY)
Facility: HOSPITAL | Age: 54
Discharge: HOME | End: 2024-03-22
Attending: EMERGENCY MEDICINE
Payer: COMMERCIAL

## 2024-03-22 VITALS
OXYGEN SATURATION: 97 % | SYSTOLIC BLOOD PRESSURE: 158 MMHG | TEMPERATURE: 99 F | WEIGHT: 117.31 LBS | DIASTOLIC BLOOD PRESSURE: 80 MMHG | HEART RATE: 90 BPM | BODY MASS INDEX: 18.37 KG/M2 | RESPIRATION RATE: 21 BRPM

## 2024-03-22 DIAGNOSIS — K62.89 RECTAL PAIN: Primary | ICD-10-CM

## 2024-03-22 LAB
ALBUMIN SERPL BCP-MCNC: 4.3 G/DL (ref 3.5–5.2)
ALP SERPL-CCNC: 86 U/L (ref 55–135)
ALT SERPL W/O P-5'-P-CCNC: 18 U/L (ref 10–44)
ANION GAP SERPL CALC-SCNC: 11 MMOL/L (ref 8–16)
AST SERPL-CCNC: 17 U/L (ref 10–40)
BASOPHILS # BLD AUTO: 0.03 K/UL (ref 0–0.2)
BASOPHILS NFR BLD: 0.4 % (ref 0–1.9)
BILIRUB SERPL-MCNC: 0.4 MG/DL (ref 0.1–1)
BUN SERPL-MCNC: 14 MG/DL (ref 6–20)
CALCIUM SERPL-MCNC: 9.8 MG/DL (ref 8.7–10.5)
CHLORIDE SERPL-SCNC: 102 MMOL/L (ref 95–110)
CO2 SERPL-SCNC: 27 MMOL/L (ref 23–29)
CREAT SERPL-MCNC: 0.8 MG/DL (ref 0.5–1.4)
CRP SERPL-MCNC: 1.2 MG/L (ref 0–8.2)
DIFFERENTIAL METHOD BLD: ABNORMAL
EOSINOPHIL # BLD AUTO: 0 K/UL (ref 0–0.5)
EOSINOPHIL NFR BLD: 0.5 % (ref 0–8)
ERYTHROCYTE [DISTWIDTH] IN BLOOD BY AUTOMATED COUNT: 12.2 % (ref 11.5–14.5)
EST. GFR  (NO RACE VARIABLE): >60 ML/MIN/1.73 M^2
GLUCOSE SERPL-MCNC: 114 MG/DL (ref 70–110)
HCT VFR BLD AUTO: 45.2 % (ref 37–48.5)
HGB BLD-MCNC: 15.1 G/DL (ref 12–16)
IMM GRANULOCYTES # BLD AUTO: 0.02 K/UL (ref 0–0.04)
IMM GRANULOCYTES NFR BLD AUTO: 0.2 % (ref 0–0.5)
LIPASE SERPL-CCNC: 16 U/L (ref 4–60)
LYMPHOCYTES # BLD AUTO: 2.4 K/UL (ref 1–4.8)
LYMPHOCYTES NFR BLD: 27.7 % (ref 18–48)
MCH RBC QN AUTO: 32.4 PG (ref 27–31)
MCHC RBC AUTO-ENTMCNC: 33.4 G/DL (ref 32–36)
MCV RBC AUTO: 97 FL (ref 82–98)
MONOCYTES # BLD AUTO: 0.4 K/UL (ref 0.3–1)
MONOCYTES NFR BLD: 4.7 % (ref 4–15)
NEUTROPHILS # BLD AUTO: 5.6 K/UL (ref 1.8–7.7)
NEUTROPHILS NFR BLD: 66.5 % (ref 38–73)
NRBC BLD-RTO: 0 /100 WBC
PLATELET # BLD AUTO: 288 K/UL (ref 150–450)
PMV BLD AUTO: 8.9 FL (ref 9.2–12.9)
POTASSIUM SERPL-SCNC: 4.3 MMOL/L (ref 3.5–5.1)
PROT SERPL-MCNC: 7.4 G/DL (ref 6–8.4)
RBC # BLD AUTO: 4.66 M/UL (ref 4–5.4)
SODIUM SERPL-SCNC: 140 MMOL/L (ref 136–145)
WBC # BLD AUTO: 8.47 K/UL (ref 3.9–12.7)

## 2024-03-22 PROCEDURE — 99285 EMERGENCY DEPT VISIT HI MDM: CPT | Mod: 25

## 2024-03-22 PROCEDURE — 25500020 PHARM REV CODE 255

## 2024-03-22 PROCEDURE — 86140 C-REACTIVE PROTEIN: CPT | Performed by: EMERGENCY MEDICINE

## 2024-03-22 PROCEDURE — 80053 COMPREHEN METABOLIC PANEL: CPT | Performed by: EMERGENCY MEDICINE

## 2024-03-22 PROCEDURE — 85025 COMPLETE CBC W/AUTO DIFF WBC: CPT | Performed by: EMERGENCY MEDICINE

## 2024-03-22 PROCEDURE — 83690 ASSAY OF LIPASE: CPT | Performed by: EMERGENCY MEDICINE

## 2024-03-22 RX ORDER — ONDANSETRON 4 MG/1
4 TABLET, ORALLY DISINTEGRATING ORAL EVERY 8 HOURS PRN
Qty: 12 TABLET | Refills: 0 | Status: SHIPPED | OUTPATIENT
Start: 2024-03-22

## 2024-03-22 RX ADMIN — IOHEXOL 75 ML: 350 INJECTION, SOLUTION INTRAVENOUS at 11:03

## 2024-03-22 RX ADMIN — IOHEXOL 50 ML: 300 INJECTION, SOLUTION INTRAVENOUS at 11:03

## 2024-03-22 NOTE — ED PROVIDER NOTES
Encounter Date: 3/22/2024       History     Chief Complaint   Patient presents with    Abdominal Pain     Hx of ulcerative colitis;    had flair 2 weeks ago;  has ulcers in rectum she is concerns about.      53-year-old female with a past medical history of ulcerative colitis, hypertension, anxiety, depression, and fibromyalgia presents with multiple complaints.  The patient reports that she has had rectal pain and a rectal lesion for the last 2 weeks.  She reports the lesion is white and has a red spot associated with it.  She reports that she had some bleeding from it 2 weeks ago but not since then.  She does report some associated nausea as well.  The patient reports that she has chronic right lower quadrant abdominal pain.  She denies any vomiting, fever, hematochezia, or melena.  There are no alleviating or aggravating factors.  She reports her last colonoscopy was approximately 3 months ago and was normal.  She reports that she has an appointment with her GI doctor next week.      Review of patient's allergies indicates:  No Known Allergies  Past Medical History:   Diagnosis Date    ADD (attention deficit disorder)     Anxiety     Depression     Fibromyalgia     Hypertension     Prediabetes     Scoliosis     Ulcerative colitis        Family History   Problem Relation Age of Onset    Ephraim syndrome Son     Thyroid disease Maternal Aunt     Breast cancer Maternal Aunt     Cancer Maternal Aunt 45        breast cancer    Lupus Neg Hx     Psoriasis Neg Hx     Rheum arthritis Neg Hx      Social History     Tobacco Use    Smoking status: Some Days     Types: Cigarettes, Vaping with nicotine    Smokeless tobacco: Never    Tobacco comments:     24 year old son with Jamaal's disease which causes mental retardation; she is ; 3 children total   Substance Use Topics    Alcohol use: Yes     Comment: Couple drinks every 2 weeks    Drug use: No     Review of Systems   Constitutional:  Negative for chills and fever.    HENT:  Negative for congestion.    Respiratory:  Negative for cough and shortness of breath.    Cardiovascular:  Negative for chest pain.   Gastrointestinal:  Positive for abdominal pain, nausea and rectal pain. Negative for vomiting.        Rectal lesion   Genitourinary:  Negative for dysuria.   Musculoskeletal:  Negative for gait problem.   Skin:  Negative for color change.   Neurological:  Negative for dizziness and numbness.   Psychiatric/Behavioral:  Negative for agitation.        Physical Exam     Initial Vitals [03/22/24 1013]   BP Pulse Resp Temp SpO2   (!) 158/80 90 (!) 21 98.6 °F (37 °C) 97 %      MAP       --         Physical Exam    Nursing note and vitals reviewed.  Constitutional: She appears well-developed and well-nourished.   HENT:   Head: Normocephalic and atraumatic.   Eyes: EOM are normal. Pupils are equal, round, and reactive to light.   Neck:   Normal range of motion.  Cardiovascular:  Normal rate and regular rhythm.           Pulmonary/Chest: Breath sounds normal.   Abdominal: Abdomen is soft. Bowel sounds are normal. She exhibits no distension. There is no abdominal tenderness. There is no rebound and no guarding.   Genitourinary: Rectum:      Guaiac result negative.   Guaiac negative stool.    Genitourinary Comments: Ulcerated lesion noted at rectum without active bleeding noted.  No drainage noted from the lesion as well.  Hemoccult-negative brown stool noted.  (nurse chaperone at bedside for rectal exam)     Musculoskeletal:         General: Normal range of motion.      Right shoulder: Normal.      Left shoulder: Normal.      Cervical back: Normal range of motion.     Neurological: She is alert and oriented to person, place, and time.   Skin: Skin is warm and dry.   Psychiatric: She has a normal mood and affect.         ED Course   Procedures  Labs Reviewed   COMPREHENSIVE METABOLIC PANEL - Abnormal; Notable for the following components:       Result Value    Glucose 114 (*)     All  other components within normal limits   CBC W/ AUTO DIFFERENTIAL - Abnormal; Notable for the following components:    MCH 32.4 (*)     MPV 8.9 (*)     All other components within normal limits   LIPASE   C-REACTIVE PROTEIN          Imaging Results              CT Abdomen Pelvis With IV Contrast NO Oral Contrast, Rectal Contrast (Final result)  Result time 03/22/24 11:51:37      Final result by Stan Walsh Jr., MD (03/22/24 11:51:37)                   Impression:      Prior hysterectomy.  Otherwise negative CT of the abdomen and pelvis.      Electronically signed by: Stan Walsh MD  Date:    03/22/2024  Time:    11:51               Narrative:    EXAMINATION:  CT ABDOMEN PELVIS WITH IV CONTRAST    CLINICAL HISTORY:  Abdominal abscess/infection suspected;    TECHNIQUE:  Low dose axial images, sagittal and coronal reformations were obtained from the lung bases to the pubic symphysis following the IV administration of 75 mL of Omnipaque 350 .  The patient received 50 cc contrast rectally.    COMPARISON:  CT abdomen pelvis of August 30, 2023.    FINDINGS:  The liver is of normal size contour and CT density without focal mass.  The gallbladder is of normal size without CT evidence of stone.  The pancreas is of normal contour and CT density without edema or mass.  The spleen is of normal size and CT density.    The adrenal glands are not enlarged.  The kidneys are of normal size contour and contrast enhancement without mass stone or hydronephrosis.  The abdominal aorta and inferior vena cava are of normal caliber.    The stomach is of normal configuration.  Small bowel dilatation or air-fluid levels are not seen.  The colon appears of normal configuration without distention or mass identified.  The opacified portion of the colon is of normal caliber without a fistula demonstrated.  No abscess, free fluid or free air is identified in the peritoneum.    Bladder is of normal contour without focal mass.  A uterus  is not seen.                                       Medications   iohexoL (OMNIPAQUE 350) 350 mg iodine/mL injection (75 mLs Intravenous Given 3/22/24 1136)   iohexoL (OMNIPAQUE 300) 300 mg iodine/mL injection (50 mLs Rectal Given 3/22/24 1136)     Medical Decision Making  53-year-old female presents with a rectal lesion and pain.    Initial differential diagnosis included but not limited to hemorrhoids, rectal fissure, and proctitis.    Amount and/or Complexity of Data Reviewed  Labs: ordered.  Radiology: ordered.    Risk  Prescription drug management.  Risk Details: The patient was emergently evaluated in the emergency department, her evaluation was significant for a middle-age female with an ulcerated lesion noted at her rectum.  This lesion is not actively bleeding.  The patient's labs showed no acute abnormalities.  The patient's CT scan of her abdomen and pelvis showed no acute processes per Radiology.  The patient is stable for discharge to home.  She does not require further care or workup at this time.  She will be discharged home with Proctofoam.  She is to keep her follow-up with her GI physician next week as scheduled.                                      Clinical Impression:  Final diagnoses:  [K62.89] Rectal pain (Primary)          ED Disposition Condition    Discharge Stable          ED Prescriptions       Medication Sig Dispense Start Date End Date Auth. Provider    hydrocortisone-pramoxine (PROCTOFOAM-HS) rectal foam Place 1 applicator rectally 2 (two) times daily. 10 g 3/22/2024 -- Maxim Ontiveros MD    ondansetron (ZOFRAN-ODT) 4 MG TbDL Take 1 tablet (4 mg total) by mouth every 8 (eight) hours as needed (nausea/vomiting). 12 tablet 3/22/2024 -- Maxim Ontiveros MD          Follow-up Information       Follow up With Specialties Details Why Contact Info    Gabriel, Mane PARK MD Gastroenterology Schedule an appointment as soon as possible for a visit   [address] [place]  [address]  [phone]               Maxim Ontiveros MD  03/22/24 3648

## 2024-04-11 ENCOUNTER — TELEPHONE (OUTPATIENT)
Dept: HEMATOLOGY/ONCOLOGY | Facility: CLINIC | Age: 54
End: 2024-04-11
Payer: COMMERCIAL

## 2024-04-11 NOTE — TELEPHONE ENCOUNTER
----- Message from Debbie Dominguez RN sent at 4/11/2024  4:49 PM CDT -----    ----- Message -----  From: Demetria Bean, Patient Care Assistant  Sent: 4/11/2024  10:02 AM CDT  To: [facility] Staff    Type: Needs Medical Advice  Who Called:  britney   Best Call Back Number: [phone]     Additional Information: britney from dr block is waning to see id she can speak to someone about getting above patient in to see doctor please call to further discuss thank you .

## 2024-04-17 ENCOUNTER — TELEPHONE (OUTPATIENT)
Dept: SURGERY | Facility: CLINIC | Age: 54
End: 2024-04-17
Payer: COMMERCIAL

## 2024-04-17 NOTE — TELEPHONE ENCOUNTER
[email or web address]      ----- Message from Karyna Yeung LPN sent at 4/17/2024  2:34 PM CDT -----  Regarding: FW: Advice  Contact: [phone]    ----- Message -----  From: Candido Soto  Sent: 4/17/2024   1:52 PM CDT  To: [facility] Staff  Subject: Advice                                           Patient is calling to schedule a procedure. Please contact pt

## 2024-04-18 ENCOUNTER — TELEPHONE (OUTPATIENT)
Dept: HEMATOLOGY/ONCOLOGY | Facility: CLINIC | Age: 54
End: 2024-04-18
Payer: COMMERCIAL

## 2024-04-18 NOTE — TELEPHONE ENCOUNTER
Pt calling for appt with Dr Kirkpatrick, provided earliest appt available and placed on wait list.

## 2024-04-24 ENCOUNTER — TELEPHONE (OUTPATIENT)
Dept: SURGERY | Facility: CLINIC | Age: 54
End: 2024-04-24
Payer: COMMERCIAL

## 2024-04-24 NOTE — TELEPHONE ENCOUNTER
lvm      ----- Message from Karyna Yeung LPN sent at 4/24/2024  2:38 PM CDT -----    ----- Message -----  From: Seda Rosas  Sent: 4/24/2024  10:16 AM CDT  To: [facility] Staff    Type: Needs Medical Advice  Who Called:  Patient   Symptoms (please be specific):    How long has patient had these symptoms:    Pharmacy name and phone #:    Best Call Back Number: [phone]  Additional Information: Patient is requesting a call back regarding her appt.on 5/1.

## 2024-06-11 NOTE — PROGRESS NOTES
"HPI:  Gleins Ruggiero is a 53 y.o. female with history of anal condyloma    Rare spotting of blood       Past Medical History:   Diagnosis Date    ADD (attention deficit disorder)     Anxiety     Depression     Fibromyalgia     Hypertension     Prediabetes     Scoliosis     Ulcerative colitis             Review of patient's allergies indicates:  No Known Allergies    Family History   Problem Relation Name Age of Onset    Ephraim syndrome Son      Thyroid disease Maternal Aunt      Breast cancer Maternal Aunt      Cancer Maternal Aunt  45        breast cancer    Lupus Neg Hx      Psoriasis Neg Hx      Rheum arthritis Neg Hx         Social History     Socioeconomic History    Marital status:    Tobacco Use    Smoking status: Some Days     Types: Cigarettes, Vaping with nicotine    Smokeless tobacco: Never    Tobacco comments:     24 year old son with Jamaal's disease which causes mental retardation; she is ; 3 children total   Substance and Sexual Activity    Alcohol use: Yes     Comment: Couple drinks every 2 weeks    Drug use: No    Sexual activity: Yes     Partners: Male       ROS:  GENERAL: No fever, chills, fatigability or weight loss.  Integument: No rashes, redness, icterus  CHEST: Denies NOVOA, cyanosis, wheezing, cough and sputum production.  CARDIOVASCULAR: Denies chest pain, PND, orthopnea or reduced exercise tolerance.  GI: Denies abd pain, dysphagia, nausea, vomiting, no hematemesis   : Denies burning on urination, no hematuria, no bacteriuria  MSK: No deformities, swelling, joint pain swelling  Neurologic: No HAs, seizures, weakness, paresthesias, gait problems    PE:  General appearance healthy  /76 (BP Location: Left arm, Patient Position: Sitting, BP Method: Medium (Automatic))   Pulse 106   Temp 97.3 °F (36.3 °C) (Temporal)   Resp 17   Ht 5' 7" (1.702 m)   Wt 58.4 kg (128 lb 12 oz)   LMP 08/18/2014   SpO2 97%   BMI 20.16 kg/m²   Sclera/ Skin anicteric  AT NC EOMI  Neck " supple trachea midline   Chest symmetric, nl excursion, no retractions, breathing comfortably  EXT - no CCE  Neuro:  Mood/ affect nl, alert and oriented x 3, moves all ext's, gait nl    Rectal  Inspection           EMANUEL low tone, poor squeeze.  No mass      Assessment:  Anal canal condyloma    Plan:  Discussed with pt.  Recommend EUA, excision and fulguration      Anoscopy Procedure Note:   Verbal consent obtained    Indications:  history of anal condyloma    Post procedure diagnosis:  same    Procedure: anoscopy    Surgeon JARAD    Assistant: MA    Specimen none    Findings:    Anal lesions - no induration, no ulceration  Anterior anal canal   Posterior anal lesions    Pt tolerated procedure well.      No complications.

## 2024-06-11 NOTE — H&P (VIEW-ONLY)
"HPI:  Glenis Ruggiero is a 53 y.o. female with history of anal condyloma    Rare spotting of blood       Past Medical History:   Diagnosis Date    ADD (attention deficit disorder)     Anxiety     Depression     Fibromyalgia     Hypertension     Prediabetes     Scoliosis     Ulcerative colitis             Review of patient's allergies indicates:  No Known Allergies    Family History   Problem Relation Name Age of Onset    Ephraim syndrome Son      Thyroid disease Maternal Aunt      Breast cancer Maternal Aunt      Cancer Maternal Aunt  45        breast cancer    Lupus Neg Hx      Psoriasis Neg Hx      Rheum arthritis Neg Hx         Social History     Socioeconomic History    Marital status:    Tobacco Use    Smoking status: Some Days     Types: Cigarettes, Vaping with nicotine    Smokeless tobacco: Never    Tobacco comments:     24 year old son with Jamaal's disease which causes mental retardation; she is ; 3 children total   Substance and Sexual Activity    Alcohol use: Yes     Comment: Couple drinks every 2 weeks    Drug use: No    Sexual activity: Yes     Partners: Male       ROS:  GENERAL: No fever, chills, fatigability or weight loss.  Integument: No rashes, redness, icterus  CHEST: Denies NOVOA, cyanosis, wheezing, cough and sputum production.  CARDIOVASCULAR: Denies chest pain, PND, orthopnea or reduced exercise tolerance.  GI: Denies abd pain, dysphagia, nausea, vomiting, no hematemesis   : Denies burning on urination, no hematuria, no bacteriuria  MSK: No deformities, swelling, joint pain swelling  Neurologic: No HAs, seizures, weakness, paresthesias, gait problems    PE:  General appearance healthy  /76 (BP Location: Left arm, Patient Position: Sitting, BP Method: Medium (Automatic))   Pulse 106   Temp 97.3 °F (36.3 °C) (Temporal)   Resp 17   Ht 5' 7" (1.702 m)   Wt 58.4 kg (128 lb 12 oz)   LMP 08/18/2014   SpO2 97%   BMI 20.16 kg/m²   Sclera/ Skin anicteric  AT NC EOMI  Neck " supple trachea midline   Chest symmetric, nl excursion, no retractions, breathing comfortably  EXT - no CCE  Neuro:  Mood/ affect nl, alert and oriented x 3, moves all ext's, gait nl    Rectal  Inspection           EMANUEL low tone, poor squeeze.  No mass      Assessment:  Anal canal condyloma    Plan:  Discussed with pt.  Recommend EUA, excision and fulguration      Anoscopy Procedure Note:   Verbal consent obtained    Indications:  history of anal condyloma    Post procedure diagnosis:  same    Procedure: anoscopy    Surgeon JARAD    Assistant: MA    Specimen none    Findings:    Anal lesions - no induration, no ulceration  Anterior anal canal   Posterior anal lesions    Pt tolerated procedure well.      No complications.

## 2024-06-19 ENCOUNTER — DOCUMENTATION ONLY (OUTPATIENT)
Dept: HEMATOLOGY/ONCOLOGY | Facility: CLINIC | Age: 54
End: 2024-06-19
Payer: COMMERCIAL

## 2024-06-19 ENCOUNTER — OFFICE VISIT (OUTPATIENT)
Dept: SURGERY | Facility: CLINIC | Age: 54
End: 2024-06-19
Payer: COMMERCIAL

## 2024-06-19 VITALS
DIASTOLIC BLOOD PRESSURE: 76 MMHG | RESPIRATION RATE: 17 BRPM | OXYGEN SATURATION: 97 % | WEIGHT: 128.75 LBS | TEMPERATURE: 97 F | HEART RATE: 106 BPM | HEIGHT: 67 IN | SYSTOLIC BLOOD PRESSURE: 132 MMHG | BODY MASS INDEX: 20.21 KG/M2

## 2024-06-19 DIAGNOSIS — A63.0 ANAL CONDYLOMA: Primary | ICD-10-CM

## 2024-06-19 PROCEDURE — 99999 PR PBB SHADOW E&M-EST. PATIENT-LVL V: CPT | Mod: PBBFAC,,, | Performed by: COLON & RECTAL SURGERY

## 2024-06-19 NOTE — NURSING
Reached out to [facility] to retrieve pathology report for colon, scanned into [employer]. 4/24 at [facility]

## 2024-06-20 DIAGNOSIS — A63.0 ANAL CONDYLOMA: Primary | ICD-10-CM

## 2024-06-20 RX ORDER — CEPHALEXIN 500 MG/1
CAPSULE ORAL
COMMUNITY

## 2024-06-20 RX ORDER — SERTRALINE HYDROCHLORIDE 100 MG/1
100 TABLET, FILM COATED ORAL DAILY
COMMUNITY

## 2024-06-20 RX ORDER — DEXTROAMPHETAMINE SACCHARATE, AMPHETAMINE ASPARTATE MONOHYDRATE, DEXTROAMPHETAMINE SULFATE AND AMPHETAMINE SULFATE 7.5; 7.5; 7.5; 7.5 MG/1; MG/1; MG/1; MG/1
30 CAPSULE, EXTENDED RELEASE ORAL EVERY MORNING
COMMUNITY

## 2024-06-20 RX ORDER — ADALIMUMAB-ADAZ 40 MG/.4ML
INJECTION, SOLUTION SUBCUTANEOUS
COMMUNITY

## 2024-06-20 RX ORDER — ACETAZOLAMIDE 125 MG/1
TABLET ORAL
COMMUNITY

## 2024-06-20 RX ORDER — BUPROPION HYDROCHLORIDE 150 MG/1
1 TABLET ORAL DAILY
Status: ON HOLD | COMMUNITY
End: 2024-06-24

## 2024-06-20 RX ORDER — AZITHROMYCIN 500 MG/1
TABLET, FILM COATED ORAL
COMMUNITY

## 2024-06-20 RX ORDER — BUSPIRONE HYDROCHLORIDE 30 MG/1
1 TABLET ORAL DAILY
COMMUNITY

## 2024-06-21 ENCOUNTER — ANESTHESIA EVENT (OUTPATIENT)
Dept: SURGERY | Facility: HOSPITAL | Age: 54
End: 2024-06-21
Payer: COMMERCIAL

## 2024-06-24 ENCOUNTER — HOSPITAL ENCOUNTER (OUTPATIENT)
Facility: HOSPITAL | Age: 54
Discharge: HOME | End: 2024-06-24
Attending: COLON & RECTAL SURGERY | Admitting: COLON & RECTAL SURGERY
Payer: COMMERCIAL

## 2024-06-24 ENCOUNTER — ANESTHESIA (OUTPATIENT)
Dept: SURGERY | Facility: HOSPITAL | Age: 54
End: 2024-06-24
Payer: COMMERCIAL

## 2024-06-24 VITALS
DIASTOLIC BLOOD PRESSURE: 58 MMHG | OXYGEN SATURATION: 99 % | TEMPERATURE: 98 F | HEIGHT: 67 IN | RESPIRATION RATE: 16 BRPM | SYSTOLIC BLOOD PRESSURE: 103 MMHG | WEIGHT: 128 LBS | BODY MASS INDEX: 20.09 KG/M2 | HEART RATE: 68 BPM

## 2024-06-24 DIAGNOSIS — A63.0 CONDYLOMA ACUMINATA: ICD-10-CM

## 2024-06-24 PROCEDURE — 36000706: Mod: PO | Performed by: COLON & RECTAL SURGERY

## 2024-06-24 PROCEDURE — 25000003 PHARM REV CODE 250: Mod: PO | Performed by: COLON & RECTAL SURGERY

## 2024-06-24 PROCEDURE — C9290 INJ, BUPIVACAINE LIPOSOME: HCPCS | Mod: PO | Performed by: COLON & RECTAL SURGERY

## 2024-06-24 PROCEDURE — 63600175 PHARM REV CODE 636 W HCPCS: Mod: PO | Performed by: COLON & RECTAL SURGERY

## 2024-06-24 PROCEDURE — 36000707: Mod: PO | Performed by: COLON & RECTAL SURGERY

## 2024-06-24 PROCEDURE — 46924 DESTRUCTION ANAL LESION(S): CPT | Mod: ,,, | Performed by: COLON & RECTAL SURGERY

## 2024-06-24 PROCEDURE — 63600175 PHARM REV CODE 636 W HCPCS: Mod: PO | Performed by: NURSE ANESTHETIST, CERTIFIED REGISTERED

## 2024-06-24 PROCEDURE — A4216 STERILE WATER/SALINE, 10 ML: HCPCS | Mod: PO | Performed by: COLON & RECTAL SURGERY

## 2024-06-24 PROCEDURE — 71000015 HC POSTOP RECOV 1ST HR: Mod: PO | Performed by: COLON & RECTAL SURGERY

## 2024-06-24 PROCEDURE — 88305 TISSUE EXAM BY PATHOLOGIST: CPT | Mod: PO | Performed by: PATHOLOGY

## 2024-06-24 PROCEDURE — 63600175 PHARM REV CODE 636 W HCPCS: Mod: PO | Performed by: ANESTHESIOLOGY

## 2024-06-24 PROCEDURE — 37000008 HC ANESTHESIA 1ST 15 MINUTES: Mod: PO | Performed by: COLON & RECTAL SURGERY

## 2024-06-24 PROCEDURE — 71000033 HC RECOVERY, INTIAL HOUR: Mod: PO | Performed by: COLON & RECTAL SURGERY

## 2024-06-24 PROCEDURE — 37000009 HC ANESTHESIA EA ADD 15 MINS: Mod: PO | Performed by: COLON & RECTAL SURGERY

## 2024-06-24 PROCEDURE — 88305 TISSUE EXAM BY PATHOLOGIST: CPT | Mod: 26,,, | Performed by: PATHOLOGY

## 2024-06-24 PROCEDURE — 25000003 PHARM REV CODE 250: Mod: PO | Performed by: NURSE ANESTHETIST, CERTIFIED REGISTERED

## 2024-06-24 RX ORDER — IBUPROFEN 600 MG/1
600 TABLET ORAL 3 TIMES DAILY
Qty: 15 TABLET | Refills: 0 | Status: SHIPPED | OUTPATIENT
Start: 2024-06-24

## 2024-06-24 RX ORDER — SODIUM CHLORIDE, SODIUM LACTATE, POTASSIUM CHLORIDE, CALCIUM CHLORIDE 600; 310; 30; 20 MG/100ML; MG/100ML; MG/100ML; MG/100ML
INJECTION, SOLUTION INTRAVENOUS CONTINUOUS
Status: DISCONTINUED | OUTPATIENT
Start: 2024-06-24 | End: 2024-06-24 | Stop reason: HOSPADM

## 2024-06-24 RX ORDER — SODIUM CHLORIDE 9 MG/ML
INJECTION, SOLUTION INTRAMUSCULAR; INTRAVENOUS; SUBCUTANEOUS
Status: DISCONTINUED | OUTPATIENT
Start: 2024-06-24 | End: 2024-06-24 | Stop reason: HOSPADM

## 2024-06-24 RX ORDER — FENTANYL CITRATE 50 UG/ML
25 INJECTION, SOLUTION INTRAMUSCULAR; INTRAVENOUS EVERY 5 MIN PRN
Status: DISCONTINUED | OUTPATIENT
Start: 2024-06-24 | End: 2024-06-24 | Stop reason: HOSPADM

## 2024-06-24 RX ORDER — FENTANYL CITRATE 50 UG/ML
INJECTION, SOLUTION INTRAMUSCULAR; INTRAVENOUS
Status: DISCONTINUED | OUTPATIENT
Start: 2024-06-24 | End: 2024-06-24

## 2024-06-24 RX ORDER — PHENYLEPHRINE HYDROCHLORIDE 10 MG/ML
INJECTION INTRAVENOUS
Status: DISCONTINUED | OUTPATIENT
Start: 2024-06-24 | End: 2024-06-24

## 2024-06-24 RX ORDER — MIDAZOLAM HYDROCHLORIDE 1 MG/ML
INJECTION INTRAMUSCULAR; INTRAVENOUS
Status: DISCONTINUED | OUTPATIENT
Start: 2024-06-24 | End: 2024-06-24

## 2024-06-24 RX ORDER — PROPOFOL 10 MG/ML
VIAL (ML) INTRAVENOUS
Status: DISCONTINUED | OUTPATIENT
Start: 2024-06-24 | End: 2024-06-24

## 2024-06-24 RX ORDER — OXYCODONE HYDROCHLORIDE 5 MG/1
5 TABLET ORAL
Status: DISCONTINUED | OUTPATIENT
Start: 2024-06-24 | End: 2024-06-24 | Stop reason: HOSPADM

## 2024-06-24 RX ORDER — LIDOCAINE HYDROCHLORIDE 10 MG/ML
1 INJECTION, SOLUTION EPIDURAL; INFILTRATION; INTRACAUDAL; PERINEURAL ONCE
Status: DISCONTINUED | OUTPATIENT
Start: 2024-06-24 | End: 2024-06-24 | Stop reason: HOSPADM

## 2024-06-24 RX ORDER — KETAMINE HCL IN 0.9 % NACL 50 MG/5 ML
SYRINGE (ML) INTRAVENOUS
Status: DISCONTINUED | OUTPATIENT
Start: 2024-06-24 | End: 2024-06-24

## 2024-06-24 RX ORDER — BUPIVACAINE HYDROCHLORIDE 2.5 MG/ML
INJECTION, SOLUTION EPIDURAL; INFILTRATION; INTRACAUDAL
Status: DISCONTINUED | OUTPATIENT
Start: 2024-06-24 | End: 2024-06-24 | Stop reason: HOSPADM

## 2024-06-24 RX ORDER — ONDANSETRON HYDROCHLORIDE 2 MG/ML
INJECTION, SOLUTION INTRAVENOUS
Status: DISCONTINUED | OUTPATIENT
Start: 2024-06-24 | End: 2024-06-24

## 2024-06-24 RX ORDER — DEXAMETHASONE SODIUM PHOSPHATE 4 MG/ML
INJECTION, SOLUTION INTRA-ARTICULAR; INTRALESIONAL; INTRAMUSCULAR; INTRAVENOUS; SOFT TISSUE
Status: DISCONTINUED | OUTPATIENT
Start: 2024-06-24 | End: 2024-06-24

## 2024-06-24 RX ORDER — PROPOFOL 10 MG/ML
VIAL (ML) INTRAVENOUS CONTINUOUS PRN
Status: DISCONTINUED | OUTPATIENT
Start: 2024-06-24 | End: 2024-06-24

## 2024-06-24 RX ORDER — OXYCODONE HYDROCHLORIDE 5 MG/1
5 TABLET ORAL EVERY 4 HOURS PRN
Qty: 30 TABLET | Refills: 0 | Status: SHIPPED | OUTPATIENT
Start: 2024-06-24

## 2024-06-24 RX ORDER — LIDOCAINE HYDROCHLORIDE 20 MG/ML
INJECTION INTRAVENOUS
Status: DISCONTINUED | OUTPATIENT
Start: 2024-06-24 | End: 2024-06-24

## 2024-06-24 RX ORDER — HYDROMORPHONE HYDROCHLORIDE 2 MG/ML
0.2 INJECTION, SOLUTION INTRAMUSCULAR; INTRAVENOUS; SUBCUTANEOUS EVERY 5 MIN PRN
Status: DISCONTINUED | OUTPATIENT
Start: 2024-06-24 | End: 2024-06-24 | Stop reason: HOSPADM

## 2024-06-24 RX ORDER — BUPIVACAINE HYDROCHLORIDE AND EPINEPHRINE 2.5; 5 MG/ML; UG/ML
INJECTION, SOLUTION EPIDURAL; INFILTRATION; INTRACAUDAL; PERINEURAL
Status: DISCONTINUED | OUTPATIENT
Start: 2024-06-24 | End: 2024-06-24 | Stop reason: HOSPADM

## 2024-06-24 RX ADMIN — FENTANYL CITRATE 50 MCG: 0.05 INJECTION, SOLUTION INTRAMUSCULAR; INTRAVENOUS at 07:06

## 2024-06-24 RX ADMIN — Medication 20 MG: at 07:06

## 2024-06-24 RX ADMIN — ONDANSETRON 4 MG: 2 INJECTION, SOLUTION INTRAMUSCULAR; INTRAVENOUS at 07:06

## 2024-06-24 RX ADMIN — PHENYLEPHRINE HYDROCHLORIDE 100 MCG: 10 INJECTION INTRAVENOUS at 07:06

## 2024-06-24 RX ADMIN — GLYCOPYRROLATE 0.2 MG: 0.2 INJECTION, SOLUTION INTRAMUSCULAR; INTRAVENOUS at 07:06

## 2024-06-24 RX ADMIN — PROPOFOL 125 MCG/KG/MIN: 10 INJECTION, EMULSION INTRAVENOUS at 07:06

## 2024-06-24 RX ADMIN — MIDAZOLAM HYDROCHLORIDE 2 MG: 2 INJECTION, SOLUTION INTRAMUSCULAR; INTRAVENOUS at 06:06

## 2024-06-24 RX ADMIN — DEXAMETHASONE SODIUM PHOSPHATE 4 MG: 4 INJECTION, SOLUTION INTRAMUSCULAR; INTRAVENOUS at 07:06

## 2024-06-24 RX ADMIN — SODIUM CHLORIDE, POTASSIUM CHLORIDE, SODIUM LACTATE AND CALCIUM CHLORIDE: 600; 310; 30; 20 INJECTION, SOLUTION INTRAVENOUS at 06:06

## 2024-06-24 RX ADMIN — LIDOCAINE HYDROCHLORIDE 30 MG: 20 INJECTION INTRAVENOUS at 07:06

## 2024-06-24 RX ADMIN — PROPOFOL 30 MG: 10 INJECTION, EMULSION INTRAVENOUS at 07:06

## 2024-06-24 NOTE — DISCHARGE INSTRUCTIONS
Rectal surgery    DO:   Avoid strenuous activity for 2 weeks.   Expect a small amount of bleeding after bowel movements.   Perform sitz baths 2-3 times per day and after bowel movements.   Begin a high fiber diet.   Take a fiber supplement or stool softener daily.   Drink 8-10 cups of water daily to prevent constipation.   Shower in 24 hours.   Remove outer dressing with first bowel movement.   Rest on the side that relieves pressure.   Resume all home medications.    DO NOT:   Do not use laxatives.   Do not lift more than 15 lbs until released by physician.   Do not drive for next 24 hours or while taking narcotic medications.  Do not take additional tylenol/acetaminophen while taking narcotic pain medication that contains tylenol/acetaminophen.     WHEN TO CALL THE DOCTOR:  Signs of infection, such as redness, excessive swelling, or pus drainage.   Increased bleeding  Fever greater than 101.     Schedule a follow-up appointment in 2 weeks.  [phone]            Exparel(bupivacaine) has been injected to provide approximately 72 hours of reduced pain after your surgery.  Do not remove the bracelet for five days.  Report to your doctor as soon as possible if you experience any of the following:   Restlessness   Anxiety   Speech problems    Lightheadedness   Numbness and tingling of the mouth and lips   Seizures    Metallic taste   Blurred vision   Tremors    Twitching   Depression   Extreme drowsiness  Avoid additional use of local anesthetics (such as dental procedures) for five days (96 hours).

## 2024-06-24 NOTE — TRANSFER OF CARE
"Anesthesia Transfer of Care Note    Patient: Glenis Ruggiero    Procedure(s) Performed: Procedure(s) (LRB):  EXAM UNDER ANESTHESIA, DIGITAL, RECTUM (N/A)  FULGURATION, CONDYLOMA, ANUS (N/A)    Patient location: PACU    Anesthesia Type: MAC    Transport from OR: Transported from OR on room air with adequate spontaneous ventilation    Post pain: adequate analgesia    Post assessment: no apparent anesthetic complications and tolerated procedure well    Post vital signs: stable    Level of consciousness: responds to stimulation    Nausea/Vomiting: no nausea/vomiting    Complications: none    Transfer of care protocol was followed      Last vitals: Visit Vitals  /68 (BP Location: Right arm, Patient Position: Sitting)   Pulse 68   Temp 36.7 °C (98 °F) (Skin)   Resp 16   Ht 5' 7" (1.702 m)   Wt 58.1 kg (128 lb)   LMP 08/18/2014   SpO2 97%   Breastfeeding No   BMI 20.05 kg/m²     "

## 2024-06-24 NOTE — ANESTHESIA POSTPROCEDURE EVALUATION
Anesthesia Post Evaluation    Patient: Glenis Ruggiero    Procedure(s) Performed: Procedure(s) (LRB):  EXAM UNDER ANESTHESIA, DIGITAL, RECTUM (N/A)  FULGURATION, CONDYLOMA, ANUS (N/A)    Final Anesthesia Type: MAC      Patient location during evaluation: PACU  Patient participation: Yes- Able to Participate  Level of consciousness: awake and alert  Post-procedure vital signs: reviewed and stable  Pain management: adequate  Airway patency: patent    PONV status at discharge: No PONV  Anesthetic complications: no      Cardiovascular status: blood pressure returned to baseline  Respiratory status: unassisted  Hydration status: euvolemic  Follow-up not needed.              Vitals Value Taken Time   /58 06/24/24 0814   Temp  06/24/24 0920   Pulse 68 06/24/24 0814   Resp 16 06/24/24 0814   SpO2 99 % 06/24/24 0814         Event Time   Out of Recovery 08:15:00         Pain/Ronny Score: Ronny Score: 5 (6/24/2024  7:43 AM)

## 2024-06-24 NOTE — ANESTHESIA PREPROCEDURE EVALUATION
06/24/2024  Glenis Ruggiero is a 53 y.o., female.      Pre-op Assessment    I have reviewed the Patient Summary Reports.     I have reviewed the Nursing Notes. I have reviewed the NPO Status.   I have reviewed the Medications.     Review of Systems  Anesthesia Hx:  No problems with previous Anesthesia                Cardiovascular:     Hypertension                                  Hypertension         Hepatic/GI:   PUD,     UC   Peptic Ulcer Disease          Neurological:    Neuromuscular Disease,       Fibromyalgia                          Neuromuscular Disease   Psych:  Psychiatric History anxiety depression ADD               Physical Exam  General: Well nourished    Airway:  Mallampati: II   Mouth Opening: Normal  TM Distance: Normal  Neck ROM: Normal ROM        Anesthesia Plan  Type of Anesthesia, risks & benefits discussed:    Anesthesia Type: Gen Supraglottic Airway  Intra-op Monitoring Plan: Standard ASA Monitors  Induction:  IV  Informed Consent: Informed consent signed with the Patient and all parties understand the risks and agree with anesthesia plan.  All questions answered.   ASA Score: 2    Ready For Surgery From Anesthesia Perspective.     .

## 2024-06-24 NOTE — OP NOTE
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  Assistant:  Scrub tech    Procedure:  Excision and fulguration anal lesions     Anesthesia: Monitored anesthesia care     Findings:  No external lesions  Anal canal small 1 mm whitish raised areas/ lesions    Estimated blood loss:  minimal mL         Specimens:   Anal lesions    Discharge Note    Outcome:   Patient tolerated treatment/procedure well without complication and is now ready for discharge.    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  High-fiber diet, Metamucil daily  2.  MiraLax p.r.n. constipation  3.  Soak in tub or irrigate wound with hand-held shower head after every bowel movement and minimum of 3 times a day.  Begin the day after surgery  4.  Where sanitary pad p.r.n., after every soak were wound irrigation and as necessary  5.  Expect to see spotting of blood on the sanitary pad and expect to see blood in the toilet bowl or on the toilet paper after a bowel movement.  6.  Please call the office immediately if the amount of blood is concerning to you, if you have fever greater than 100, if you cannot urinate, or if you developed anal pain which she cannot get under control with the prescribed medications.   [id number]     MARCO ANTONIO Wen MD

## 2024-06-24 NOTE — DISCHARGE SUMMARY
[facility] - [facility]  Discharge Note  Short Stay    Procedure(s) (LRB):  EXAM UNDER ANESTHESIA, DIGITAL, RECTUM (N/A)  FULGURATION, CONDYLOMA, ANUS (N/A)      OUTCOME: Patient tolerated treatment/procedure well without complication and is now ready for discharge.    DISPOSITION: Home or Self Care    FINAL DIAGNOSIS:  anal condyloma    FOLLOWUP: In clinic    DISCHARGE INSTRUCTIONS:    1.  High-fiber diet  2.  MiraLax p.r.n. constipation  3.  Soak in tub or irrigate wound with hand-held shower head after every bowel movement and minimum of 3 times a day.  Begin the day after surgery  4.  Where sanitary pad p.r.n., after every soak were wound irrigation and as necessary  5.  Expect to see spotting of blood on the sanitary pad and expect to see blood in the toilet bowl or on the toilet paper after a bowel movement.  6.  Please call the office immediately if the amount of blood is concerning to you, if you have fever greater than 100, if you cannot urinate, or if you developed anal pain which she cannot get under control with the prescribed medications.     TIME SPENT ON DISCHARGE: 1 minutes

## 2024-06-26 LAB
FINAL PATHOLOGIC DIAGNOSIS: NORMAL
GROSS: NORMAL
Lab: NORMAL

## 2024-07-08 ENCOUNTER — HOSPITAL ENCOUNTER (OUTPATIENT)
Dept: RADIOLOGY | Facility: CLINIC | Age: 54
Discharge: HOME | End: 2024-07-08
Attending: NURSE PRACTITIONER
Payer: COMMERCIAL

## 2024-07-08 DIAGNOSIS — Z12.31 ENCOUNTER FOR SCREENING MAMMOGRAM FOR BREAST CANCER: ICD-10-CM

## 2024-07-08 PROCEDURE — 77063 BREAST TOMOSYNTHESIS BI: CPT | Mod: 26,,, | Performed by: RADIOLOGY

## 2024-07-08 PROCEDURE — 77067 SCR MAMMO BI INCL CAD: CPT | Mod: TC,PO

## 2024-07-08 PROCEDURE — 77067 SCR MAMMO BI INCL CAD: CPT | Mod: 26,,, | Performed by: RADIOLOGY

## 2024-07-25 ENCOUNTER — TELEPHONE (OUTPATIENT)
Dept: SURGERY | Facility: CLINIC | Age: 54
End: 2024-07-25
Payer: COMMERCIAL

## 2024-07-25 ENCOUNTER — OFFICE VISIT (OUTPATIENT)
Dept: SURGERY | Facility: CLINIC | Age: 54
End: 2024-07-25
Payer: COMMERCIAL

## 2024-07-25 DIAGNOSIS — A63.0 ANAL CONDYLOMA: Primary | ICD-10-CM

## 2024-07-25 PROCEDURE — 99999 PR PBB SHADOW E&M-EST. PATIENT-LVL III: CPT | Mod: PBBFAC,,, | Performed by: COLON & RECTAL SURGERY

## 2024-07-25 PROCEDURE — 99024 POSTOP FOLLOW-UP VISIT: CPT | Mod: S$GLB,,, | Performed by: COLON & RECTAL SURGERY

## 2024-07-25 NOTE — TELEPHONE ENCOUNTER
Confirmed 1:40pm appt with pt. -- pt voiced understanding      ----- Message from Seda Rosas sent at 7/25/2024 12:03 PM CDT -----  Type: Needs Medical Advice  Who Called:  Patient   Symptoms (please be specific):    How long has patient had these symptoms:    Pharmacy name and phone #:    Best Call Back Number: [phone]  Additional Information: Patient called to confirm her appt. today at 1:40.

## 2024-08-07 VITALS
SYSTOLIC BLOOD PRESSURE: 120 MMHG | RESPIRATION RATE: 17 BRPM | DIASTOLIC BLOOD PRESSURE: 70 MMHG | HEIGHT: 66 IN | TEMPERATURE: 97 F | OXYGEN SATURATION: 97 % | BODY MASS INDEX: 21.76 KG/M2 | WEIGHT: 135.38 LBS | HEART RATE: 82 BPM

## 2024-10-30 ENCOUNTER — OFFICE VISIT (OUTPATIENT)
Dept: SURGERY | Facility: CLINIC | Age: 54
End: 2024-10-30
Payer: MEDICAID

## 2024-10-30 DIAGNOSIS — A63.0 ANAL CONDYLOMA: Primary | ICD-10-CM

## 2024-10-30 PROCEDURE — 99999 PR PBB SHADOW E&M-EST. PATIENT-LVL I: CPT | Mod: PBBFAC,,, | Performed by: COLON & RECTAL SURGERY

## 2024-10-30 PROCEDURE — 99499 UNLISTED E&M SERVICE: CPT | Mod: S$PBB,,, | Performed by: COLON & RECTAL SURGERY

## 2024-10-30 PROCEDURE — 99211 OFF/OP EST MAY X REQ PHY/QHP: CPT | Mod: PBBFAC,PN | Performed by: COLON & RECTAL SURGERY

## (undated) DEVICE — LUBRICANT SURGILUBE 2 OZ

## (undated) DEVICE — LINER SUCTION 3000CC

## (undated) DEVICE — SUT EASE CROSSBOW CLSR SYS

## (undated) DEVICE — ELECTRODE REM PLYHSV RETURN 9

## (undated) DEVICE — TOWEL OR DISP STRL BLUE 4/PK

## (undated) DEVICE — SOL POVIDONE SCRUB IODINE 4 OZ

## (undated) DEVICE — PAD PREP CUFFED NS 24X48IN

## (undated) DEVICE — PACK CUSTOM UNIV BASIN SLI

## (undated) DEVICE — PENCIL ROCKER SWITCH 10FT CORD

## (undated) DEVICE — NDL 22GA X1 1/2 REG BEVEL

## (undated) DEVICE — SUT MONOCRYL 0 CT-1 UND MON

## (undated) DEVICE — SYS LABEL CORRECT MED

## (undated) DEVICE — SUT VICRYL 0 CT-2 27 DYE

## (undated) DEVICE — PANTIES FEMININE NAPKIN LG/XLG

## (undated) DEVICE — SUT MONOCRYL 4-0 PS-2

## (undated) DEVICE — TRAY DRY SPONGE SCRUB W FOAM

## (undated) DEVICE — CARTRIDGE BABCOCK GRASPER 5X45

## (undated) DEVICE — KIT SAHARA DRAPE DRAW/LIFT

## (undated) DEVICE — SUT VLOC 90 3-0 V-20 NDL 9

## (undated) DEVICE — COVER PROXIMA MAYO STAND

## (undated) DEVICE — OBTURATOR BLADELESS 8MM XI CLR

## (undated) DEVICE — ELECTRODE BLADE INSULATED 1 IN

## (undated) DEVICE — IRRIGATOR SUCTION W/TIP

## (undated) DEVICE — DRAPE LAP T SHT W/ INSTR PAD

## (undated) DEVICE — SUT ETHIBOND EXCEL 0 CT2 30

## (undated) DEVICE — GLOVE SURG ULTRA TOUCH 7

## (undated) DEVICE — KIT RETR PERI/GYN W/O STAYS

## (undated) DEVICE — GAUZE SPONGE 4X4 12PLY

## (undated) DEVICE — SPONGE DERMACEA GAUZE 4X4

## (undated) DEVICE — LEGGING CLEAR POLY 2/PACK

## (undated) DEVICE — GOWN POLY REINF BRTH SLV XL

## (undated) DEVICE — TROCAR ENDOPATH XCEL 11MM 10CM

## (undated) DEVICE — HANDLE SURG LIGHT NONRIGID

## (undated) DEVICE — SEAL UNIVERSAL 5MM-8MM XI

## (undated) DEVICE — SOL ELECTROLUBE ANTI-STIC

## (undated) DEVICE — NDL SAFETY 21G X 1 1/2 ECLPSE

## (undated) DEVICE — PAD SANITARY OB STERILE

## (undated) DEVICE — SYR 10CC LUER LOCK

## (undated) DEVICE — SEE MEDLINE ITEM 154981

## (undated) DEVICE — SOL CLEARIFY VISUALIZATION LAP

## (undated) DEVICE — GLOVE SENSICARE PI ALOE 7

## (undated) DEVICE — APPLICATOR CHLORAPREP ORN 26ML

## (undated) DEVICE — DRAPE ABDOMINAL TIBURON 14X11

## (undated) DEVICE — NDL SURE-SNAP HYPO SAF 21GX1.5

## (undated) DEVICE — TIP YANKAUERS BULB NO VENT

## (undated) DEVICE — UNDERGLOVES BIOGEL PI SIZE 7.5

## (undated) DEVICE — TROCAR ENDO Z THREAD KII 5X100

## (undated) DEVICE — SET TUBE PNEUMOCLEAR SE HI FLO

## (undated) DEVICE — GLOVE SURG ULTRA TOUCH 7.5

## (undated) DEVICE — SLEEVE SCD EXPRESS KNEE MEDIUM

## (undated) DEVICE — DRAPE ARM DAVINCI XI

## (undated) DEVICE — TRAY MINOR GEN SURG OMC

## (undated) DEVICE — SEALER VESSEL EXTEND

## (undated) DEVICE — ADHESIVE DERMABOND ADVANCED

## (undated) DEVICE — UNDERPAD ULTRASORB 300LB 30X36

## (undated) DEVICE — SYR LUER LOCK STERILE 10ML

## (undated) DEVICE — SOL NS 1000CC

## (undated) DEVICE — TAPE SILK 3IN

## (undated) DEVICE — STRAP OR TABLE 5IN X 72IN

## (undated) DEVICE — PACK SIRUS BASIC V SURG STRL

## (undated) DEVICE — SUT ABS CLIP LAPRA-TY CTD

## (undated) DEVICE — SYR ONLY LUER LOCK 20CC

## (undated) DEVICE — GOWN POLY REINF X-LONG XL

## (undated) DEVICE — SET CYSTO IRRIGATION UNIV SPIK

## (undated) DEVICE — HOOK STAY ELAS 5MM 8EA/PK

## (undated) DEVICE — SCRUB DYNA-HEX LIQ 4% CHG 4OZ

## (undated) DEVICE — SOL NACL IRR 1000ML BTL

## (undated) DEVICE — BRIEF MESH LARGE

## (undated) DEVICE — DRAPE UINDERBUT GRAD PCH

## (undated) DEVICE — Device

## (undated) DEVICE — PAD PINK TRENDELENBURG POS XL

## (undated) DEVICE — COVER TIP CURVED SCISSORS XI

## (undated) DEVICE — CLOSURE SKIN STERI STRIP 1/2X4

## (undated) DEVICE — BAG TISSUE RETRIEVAL 5MM

## (undated) DEVICE — TRAY CATH FOL SIL URIMTR 16FR

## (undated) DEVICE — DRAPE ADPT RUMI II ADVINCULA

## (undated) DEVICE — SUT 3-0 VICRYL / SH (J416)

## (undated) DEVICE — SOL BETADINE 5%

## (undated) DEVICE — DRAPE COLUMN DAVINCI XI

## (undated) DEVICE — SCRUB 10% POVIDONE IODINE 4OZ

## (undated) DEVICE — UNDERGLOVES BIOGEL PI SZ 7 LF

## (undated) DEVICE — SUT GORETEX CV-4 TH-26 36IN

## (undated) DEVICE — PAD OB HS-77 STRL PREPACK 12S

## (undated) DEVICE — SOL 9P NACL IRR PIC IL